# Patient Record
Sex: MALE | Race: BLACK OR AFRICAN AMERICAN | NOT HISPANIC OR LATINO | Employment: FULL TIME | URBAN - METROPOLITAN AREA
[De-identification: names, ages, dates, MRNs, and addresses within clinical notes are randomized per-mention and may not be internally consistent; named-entity substitution may affect disease eponyms.]

---

## 2018-03-07 PROBLEM — E11.621 TYPE 2 DIABETES MELLITUS WITH FOOT ULCER, WITH LONG-TERM CURRENT USE OF INSULIN: Status: ACTIVE | Noted: 2018-03-07

## 2018-03-07 PROBLEM — N18.30 CKD STAGE 3 SECONDARY TO DIABETES: Status: ACTIVE | Noted: 2018-03-07

## 2018-03-07 PROBLEM — Z79.4 TYPE 2 DIABETES MELLITUS WITH FOOT ULCER, WITH LONG-TERM CURRENT USE OF INSULIN: Status: ACTIVE | Noted: 2018-03-07

## 2018-03-07 PROBLEM — L97.509 TYPE 2 DIABETES MELLITUS WITH FOOT ULCER, WITH LONG-TERM CURRENT USE OF INSULIN: Status: ACTIVE | Noted: 2018-03-07

## 2018-03-07 PROBLEM — E11.22 CKD STAGE 3 SECONDARY TO DIABETES: Status: ACTIVE | Noted: 2018-03-07

## 2018-03-08 PROBLEM — L03.115 CELLULITIS OF RIGHT FOOT: Status: ACTIVE | Noted: 2018-03-08

## 2018-03-08 PROBLEM — Z78.9 KNOWLEDGE DEFICIT ABOUT THERAPEUTIC DIET: Chronic | Status: ACTIVE | Noted: 2018-03-08

## 2018-03-08 PROBLEM — E66.9 OBESITY, CLASS II, BMI 35-39.9: Chronic | Status: ACTIVE | Noted: 2018-03-08

## 2018-03-08 PROBLEM — L02.619 ABSCESS OF FOOT: Status: ACTIVE | Noted: 2018-03-08

## 2018-03-08 PROBLEM — E11.22 CKD STAGE 3 SECONDARY TO DIABETES: Status: RESOLVED | Noted: 2018-03-07 | Resolved: 2018-03-08

## 2018-03-08 PROBLEM — Z55.8 KNOWLEDGE DEFICIT ABOUT THERAPEUTIC DIET: Chronic | Status: ACTIVE | Noted: 2018-03-08

## 2018-03-08 PROBLEM — E11.621 DIABETIC ULCER OF RIGHT MIDFOOT ASSOCIATED WITH TYPE 2 DIABETES MELLITUS, WITH FAT LAYER EXPOSED: Status: ACTIVE | Noted: 2018-03-08

## 2018-03-08 PROBLEM — I10 BENIGN ESSENTIAL HTN: Chronic | Status: ACTIVE | Noted: 2018-03-08

## 2018-03-08 PROBLEM — L97.412 DIABETIC ULCER OF RIGHT MIDFOOT ASSOCIATED WITH TYPE 2 DIABETES MELLITUS, WITH FAT LAYER EXPOSED: Status: ACTIVE | Noted: 2018-03-08

## 2018-03-08 PROBLEM — M86.9 OSTEOMYELITIS OF RIGHT FOOT: Status: ACTIVE | Noted: 2018-03-08

## 2018-03-08 PROBLEM — N18.4 CKD (CHRONIC KIDNEY DISEASE), STAGE IV: Chronic | Status: ACTIVE | Noted: 2018-03-08

## 2018-03-08 PROBLEM — N18.30 CKD STAGE 3 SECONDARY TO DIABETES: Status: RESOLVED | Noted: 2018-03-07 | Resolved: 2018-03-08

## 2018-03-08 PROBLEM — E66.812 OBESITY, CLASS II, BMI 35-39.9: Chronic | Status: ACTIVE | Noted: 2018-03-08

## 2018-03-11 PROBLEM — A49.1 GBS (GROUP B STREPTOCOCCUS) INFECTION: Status: ACTIVE | Noted: 2018-03-11

## 2018-05-15 PROBLEM — N10 ACUTE PYELONEPHRITIS: Status: ACTIVE | Noted: 2018-05-15

## 2018-05-18 PROBLEM — D63.1 ANEMIA OF RENAL DISEASE: Chronic | Status: ACTIVE | Noted: 2018-05-18

## 2018-05-18 PROBLEM — N18.9 ANEMIA OF RENAL DISEASE: Chronic | Status: ACTIVE | Noted: 2018-05-18

## 2018-05-19 PROBLEM — L03.115 CELLULITIS OF RIGHT FOOT: Status: RESOLVED | Noted: 2018-03-08 | Resolved: 2018-05-19

## 2019-01-08 PROBLEM — D64.9 SYMPTOMATIC ANEMIA: Status: ACTIVE | Noted: 2019-01-08

## 2019-01-09 PROBLEM — E87.5 HYPERKALEMIA: Status: ACTIVE | Noted: 2019-01-09

## 2019-01-13 PROBLEM — E87.5 HYPERKALEMIA: Status: RESOLVED | Noted: 2019-01-09 | Resolved: 2019-01-13

## 2019-01-14 PROBLEM — N18.6 ESRD (END STAGE RENAL DISEASE): Status: ACTIVE | Noted: 2019-01-14

## 2019-01-14 PROBLEM — R50.9 FEVER: Status: ACTIVE | Noted: 2019-01-14

## 2019-01-14 PROBLEM — D64.9 SYMPTOMATIC ANEMIA: Status: RESOLVED | Noted: 2019-01-08 | Resolved: 2019-01-14

## 2019-01-17 PROBLEM — R50.9 FEVER: Status: RESOLVED | Noted: 2019-01-14 | Resolved: 2019-01-17

## 2019-01-17 PROBLEM — N18.4 CKD (CHRONIC KIDNEY DISEASE), STAGE IV: Chronic | Status: RESOLVED | Noted: 2018-03-08 | Resolved: 2019-01-17

## 2019-05-20 DIAGNOSIS — Z76.82 ORGAN TRANSPLANT CANDIDATE: Primary | ICD-10-CM

## 2019-05-21 ENCOUNTER — TELEPHONE (OUTPATIENT)
Dept: TRANSPLANT | Facility: CLINIC | Age: 34
End: 2019-05-21

## 2019-10-30 PROBLEM — I95.9 HYPOTENSION: Status: ACTIVE | Noted: 2019-10-30

## 2019-10-31 PROBLEM — E87.6 HYPOKALEMIA: Status: ACTIVE | Noted: 2019-10-31

## 2019-10-31 PROBLEM — T82.848A PAIN FROM A/V FISTULA: Status: ACTIVE | Noted: 2019-10-31

## 2019-11-02 PROBLEM — M00.9 SEPTIC ARTHRITIS OF LEFT FOOT: Status: ACTIVE | Noted: 2019-11-02

## 2019-11-02 PROBLEM — I95.9 HYPOTENSION: Status: RESOLVED | Noted: 2019-10-30 | Resolved: 2019-11-02

## 2019-11-03 PROBLEM — E87.6 HYPOKALEMIA: Status: RESOLVED | Noted: 2019-10-31 | Resolved: 2019-11-03

## 2019-11-05 PROBLEM — L08.9 WOUND INFECTION: Status: ACTIVE | Noted: 2019-11-05

## 2019-11-05 PROBLEM — T14.8XXA WOUND INFECTION: Status: ACTIVE | Noted: 2019-11-05

## 2019-11-05 PROBLEM — M00.9 SEPTIC ARTHRITIS OF LEFT FOOT: Status: RESOLVED | Noted: 2019-11-02 | Resolved: 2019-11-05

## 2020-01-22 PROBLEM — R50.9 FEVER: Status: ACTIVE | Noted: 2020-01-22

## 2020-01-23 PROBLEM — Z95.828 S/P ARTERIOVENOUS (AV) GRAFT PLACEMENT: Status: ACTIVE | Noted: 2020-01-23

## 2020-01-25 PROBLEM — I82.90 THROMBOSIS: Status: ACTIVE | Noted: 2020-01-25

## 2020-01-25 PROBLEM — A41.9 SEPSIS: Status: ACTIVE | Noted: 2020-01-25

## 2020-01-25 PROBLEM — R50.9 FEVER: Status: RESOLVED | Noted: 2020-01-22 | Resolved: 2020-01-25

## 2020-01-28 PROBLEM — R50.9 FEVER: Status: ACTIVE | Noted: 2020-01-28

## 2020-01-28 PROBLEM — R50.9 FEVER: Status: RESOLVED | Noted: 2020-01-28 | Resolved: 2020-01-28

## 2020-03-09 ENCOUNTER — HOSPITAL ENCOUNTER (OUTPATIENT)
Facility: HOSPITAL | Age: 35
Discharge: HOME | End: 2020-03-10
Attending: HOSPITALIST | Admitting: HOSPITALIST
Payer: COMMERCIAL

## 2020-03-09 DIAGNOSIS — R07.9 CHEST PAIN: ICD-10-CM

## 2020-03-09 DIAGNOSIS — Z99.2 ACUTE RENAL FAILURE SUPERIMPOSED ON CHRONIC KIDNEY DISEASE, ON CHRONIC DIALYSIS: ICD-10-CM

## 2020-03-09 DIAGNOSIS — D64.9 ANEMIA REQUIRING TRANSFUSIONS: Primary | Chronic | ICD-10-CM

## 2020-03-09 DIAGNOSIS — N18.6 ESRD (END STAGE RENAL DISEASE): ICD-10-CM

## 2020-03-09 DIAGNOSIS — N18.6 ACUTE RENAL FAILURE SUPERIMPOSED ON CHRONIC KIDNEY DISEASE, ON CHRONIC DIALYSIS: ICD-10-CM

## 2020-03-09 DIAGNOSIS — N17.9 ACUTE RENAL FAILURE SUPERIMPOSED ON CHRONIC KIDNEY DISEASE, ON CHRONIC DIALYSIS: ICD-10-CM

## 2020-03-09 PROBLEM — E66.9 OBESITY (BMI 30-39.9): Status: ACTIVE | Noted: 2020-03-09

## 2020-03-09 LAB — POCT GLUCOSE: 110 MG/DL (ref 70–110)

## 2020-03-09 PROCEDURE — A4216 STERILE WATER/SALINE, 10 ML: HCPCS | Performed by: HOSPITALIST

## 2020-03-09 PROCEDURE — 25000003 PHARM REV CODE 250: Performed by: HOSPITALIST

## 2020-03-09 PROCEDURE — G0379 DIRECT REFER HOSPITAL OBSERV: HCPCS

## 2020-03-09 PROCEDURE — G0378 HOSPITAL OBSERVATION PER HR: HCPCS

## 2020-03-09 PROCEDURE — 94761 N-INVAS EAR/PLS OXIMETRY MLT: CPT

## 2020-03-09 RX ORDER — IBUPROFEN 200 MG
16 TABLET ORAL
Status: DISCONTINUED | OUTPATIENT
Start: 2020-03-09 | End: 2020-03-10 | Stop reason: HOSPADM

## 2020-03-09 RX ORDER — AMOXICILLIN 250 MG
1 CAPSULE ORAL 2 TIMES DAILY
Status: DISCONTINUED | OUTPATIENT
Start: 2020-03-09 | End: 2020-03-10 | Stop reason: HOSPADM

## 2020-03-09 RX ORDER — INSULIN ASPART 100 [IU]/ML
2-10 INJECTION, SOLUTION INTRAVENOUS; SUBCUTANEOUS
Status: DISCONTINUED | OUTPATIENT
Start: 2020-03-09 | End: 2020-03-10 | Stop reason: HOSPADM

## 2020-03-09 RX ORDER — AMMONIUM LACTATE 12 G/100G
1 LOTION TOPICAL
Status: ON HOLD | COMMUNITY
Start: 2019-05-21 | End: 2021-03-12 | Stop reason: HOSPADM

## 2020-03-09 RX ORDER — SODIUM CHLORIDE 0.9 % (FLUSH) 0.9 %
10 SYRINGE (ML) INJECTION EVERY 8 HOURS
Status: DISCONTINUED | OUTPATIENT
Start: 2020-03-09 | End: 2020-03-10 | Stop reason: HOSPADM

## 2020-03-09 RX ORDER — IBUPROFEN 200 MG
24 TABLET ORAL
Status: DISCONTINUED | OUTPATIENT
Start: 2020-03-09 | End: 2020-03-10 | Stop reason: HOSPADM

## 2020-03-09 RX ORDER — ACETAMINOPHEN 325 MG/1
650 TABLET ORAL EVERY 8 HOURS PRN
Status: DISCONTINUED | OUTPATIENT
Start: 2020-03-09 | End: 2020-03-10

## 2020-03-09 RX ORDER — SEVELAMER CARBONATE 800 MG/1
1600 TABLET, FILM COATED ORAL
Status: DISCONTINUED | OUTPATIENT
Start: 2020-03-10 | End: 2020-03-10 | Stop reason: HOSPADM

## 2020-03-09 RX ORDER — GLUCAGON 1 MG
1 KIT INJECTION
Status: DISCONTINUED | OUTPATIENT
Start: 2020-03-09 | End: 2020-03-10 | Stop reason: HOSPADM

## 2020-03-09 RX ORDER — GABAPENTIN 100 MG/1
100 CAPSULE ORAL
Status: ON HOLD | COMMUNITY
Start: 2019-05-21 | End: 2021-03-12 | Stop reason: HOSPADM

## 2020-03-09 RX ORDER — CINACALCET 60 MG/1
TABLET, FILM COATED ORAL
Status: ON HOLD | COMMUNITY
Start: 2020-01-23 | End: 2020-06-11 | Stop reason: HOSPADM

## 2020-03-09 RX ORDER — ONDANSETRON 2 MG/ML
4 INJECTION INTRAMUSCULAR; INTRAVENOUS EVERY 8 HOURS PRN
Status: DISCONTINUED | OUTPATIENT
Start: 2020-03-09 | End: 2020-03-10

## 2020-03-09 RX ADMIN — Medication 10 ML: at 10:03

## 2020-03-09 RX ADMIN — SENNOSIDES AND DOCUSATE SODIUM 1 TABLET: 8.6; 5 TABLET ORAL at 10:03

## 2020-03-09 NOTE — PLAN OF CARE
Outside Transfer Acceptance Note        Patients name/MRN:     Brenda Huerta MRN: [id number]     Referring Physician or Mid-Level provider giving report:   Ethan Baxter MD     Referral Facility:    [facility]     Date/Time of Acceptance:    3/9/20 at 3:23pm     Accepting Physician for admission to hospital: Bhavik Almanza MD ()     Accepting facility:    [facility]     Consulting Physicians from [facility] involved in case:     Reason for transfer request:    Capacity issues    33yo man with ESRD on HD MWF who has missed 2 out of last 3 HD sessions presents with volume overload and fatigue. He had been found to have low Hb on 3/5 and was told by outpatient dialysis center to come in for blood at that time, but he did not and then missed his 3/6 dialysis session. He presented this am with fatigue and some mild dyspnea. Discussed with outpatient Nephrologist and he requires dialysis and blood today (Hb 7.0), but due to capacity issues at [facility], is not able to receive at their facility.    To Do List upon arrival:    1) Consult Nephrology for HD  2) Transfuse 1u PRBC    Vital signs:   BP (!) 149/89   Pulse 71   Temp 98.4 °F (36.9 °C) (Oral)   Resp 16   Ht 6' (1.829 m)   Wt 115.2 kg (253 lb 15.5 oz)   SpO2 99%   BMI 34.44 kg/m²     Past Medical History:   Diagnosis Date    Anemia     Asthma     Bacteremia 01/25/2020    Cellulitis of right foot     Cellulitis of right index finger     Chronic recurrent multifocal osteomyelitis of right foot     CKD (chronic kidney disease) stage 5, GFR less than 15 ml/min     Clotted renal dialysis AV graft     Diabetes mellitus     Diabetic foot ulcer     Dyspnea     Encounter for blood transfusion     ESRD (end stage renal disease) on dialysis     High cholesterol     History of group B Streptococcus (GBS) infection 03/07/2018    History of necrotising fasciitis     Hypertension     Hypokalemia     Osteomyelitis of  left foot     PAD (peripheral artery disease)     Pyelonephritis     S/P transmetatarsal amputation of foot, right     Secondary lymphedema     Sepsis due to other specified Staphylococcus 01/25/2020    staph lugdunensis    Transfusion reaction     fever/hives     Past Surgical History:   Procedure Laterality Date    FOOT AMPUTATION Right     Partial    INSERTION OF CATHETER FOR HEMODIALYSIS Right 1/10/2019    Procedure: INSERTION, CATHETER, HEMODIALYSIS;  Surgeon: Salas Ryder MD;  Location: [facility];  Service: General;  Laterality: Right;  RIGHT SUBCLAVIAN    INSERTION OF TUNNELED CENTRAL VENOUS HEMODIALYSIS CATHETER Left 01/28/2020    KNEE ARTHROSCOPY Right 2011    PERCUTANEOUS TRANSLUMINAL ANGIOPLASTY OF ARTERIOVENOUS FISTULA Right 10/22/2019    balloon angioplasty and stent    THIGH SURGERY Right     necrotising fasciitis    TOE AMPUTATION Left 03/2014    5th toe    TOTAL ANKLE ARTHROPLASTY Right 2010     Allergies:  Review of patient's allergies indicates:   Allergen Reactions    Amphotericin b Other (See Comments)    Bactrim [sulfamethoxazole-trimethoprim]     Lipitor [atorvastatin] Other (See Comments)     cough    Lisinopril Other (See Comments)     Cough      Percocet [oxycodone-acetaminophen] Itching    Codeine Rash       Current Facility-Administered Medications:   Current Facility-Administered Medications:     0.9%  NaCl infusion (for blood administration), , Intravenous, Q24H PRN, Ethan Baxter MD    Current Outpatient Medications:     sevelamer carbonate (RENVELA) 800 mg Tab, Take 2 tablets (1,600 mg total) by mouth 3 (three) times daily with meals., Disp: 180 tablet, Rfl: 11    LABS:  see Epic    Imaging:  see Epic      Bhavik Almanza MD  Department of Hospital Medicine  Patient Flow Center/   [phone]

## 2020-03-10 VITALS
HEIGHT: 72 IN | HEART RATE: 65 BPM | RESPIRATION RATE: 18 BRPM | DIASTOLIC BLOOD PRESSURE: 98 MMHG | BODY MASS INDEX: 34.27 KG/M2 | SYSTOLIC BLOOD PRESSURE: 167 MMHG | WEIGHT: 253 LBS | TEMPERATURE: 97 F | OXYGEN SATURATION: 99 %

## 2020-03-10 PROBLEM — D64.9 ANEMIA REQUIRING TRANSFUSIONS: Chronic | Status: ACTIVE | Noted: 2018-05-18

## 2020-03-10 PROBLEM — Z91.158 NONCOMPLIANCE WITH RENAL DIALYSIS: Status: ACTIVE | Noted: 2020-03-10

## 2020-03-10 LAB
ABO + RH BLD: NORMAL
ANION GAP SERPL CALC-SCNC: 16 MMOL/L (ref 8–16)
BASOPHILS # BLD AUTO: 0.08 K/UL (ref 0–0.2)
BASOPHILS NFR BLD: 1.6 % (ref 0–1.9)
BLD GP AB SCN CELLS X3 SERPL QL: NORMAL
BLD PROD TYP BPU: NORMAL
BLD PROD TYP BPU: NORMAL
BLOOD GROUP ANTIBODIES SERPL: NORMAL
BLOOD UNIT EXPIRATION DATE: NORMAL
BLOOD UNIT EXPIRATION DATE: NORMAL
BLOOD UNIT TYPE CODE: 6200
BLOOD UNIT TYPE CODE: 6200
BLOOD UNIT TYPE: NORMAL
BLOOD UNIT TYPE: NORMAL
BUN SERPL-MCNC: 75 MG/DL (ref 6–20)
CALCIUM SERPL-MCNC: 8.5 MG/DL (ref 8.7–10.5)
CHLORIDE SERPL-SCNC: 108 MMOL/L (ref 95–110)
CO2 SERPL-SCNC: 20 MMOL/L (ref 23–29)
CODING SYSTEM: NORMAL
CODING SYSTEM: NORMAL
CREAT SERPL-MCNC: 17.1 MG/DL (ref 0.5–1.4)
DIFFERENTIAL METHOD: ABNORMAL
DISPENSE STATUS: NORMAL
DISPENSE STATUS: NORMAL
EOSINOPHIL # BLD AUTO: 0.3 K/UL (ref 0–0.5)
EOSINOPHIL NFR BLD: 5.8 % (ref 0–8)
ERYTHROCYTE [DISTWIDTH] IN BLOOD BY AUTOMATED COUNT: 16.3 % (ref 11.5–14.5)
EST. GFR  (AFRICAN AMERICAN): 4 ML/MIN/1.73 M^2
EST. GFR  (NON AFRICAN AMERICAN): 3 ML/MIN/1.73 M^2
GLUCOSE SERPL-MCNC: 98 MG/DL (ref 70–110)
HCT VFR BLD AUTO: 24.1 % (ref 40–54)
HGB BLD-MCNC: 6.9 G/DL (ref 14–18)
IMM GRANULOCYTES # BLD AUTO: 0.01 K/UL (ref 0–0.04)
LYMPHOCYTES # BLD AUTO: 1.3 K/UL (ref 1–4.8)
LYMPHOCYTES NFR BLD: 26 % (ref 18–48)
MAGNESIUM SERPL-MCNC: 2.6 MG/DL (ref 1.6–2.6)
MCH RBC QN AUTO: 26.6 PG (ref 27–31)
MCHC RBC AUTO-ENTMCNC: 28.6 G/DL (ref 32–36)
MCV RBC AUTO: 93 FL (ref 82–98)
MONOCYTES # BLD AUTO: 0.5 K/UL (ref 0.3–1)
MONOCYTES NFR BLD: 9.3 % (ref 4–15)
NEUTROPHILS # BLD AUTO: 3 K/UL (ref 1.8–7.7)
NEUTROPHILS NFR BLD: 57.1 % (ref 38–73)
NRBC BLD-RTO: 0 /100 WBC
NUM UNITS TRANS PACKED RBC: NORMAL
NUM UNITS TRANS PACKED RBC: NORMAL
PHOSPHATE SERPL-MCNC: 8.7 MG/DL (ref 2.7–4.5)
PLATELET # BLD AUTO: 148 K/UL (ref 150–350)
PMV BLD AUTO: 10.2 FL (ref 9.2–12.9)
POCT GLUCOSE: 116 MG/DL (ref 70–110)
POCT GLUCOSE: 99 MG/DL (ref 70–110)
POTASSIUM SERPL-SCNC: 5 MMOL/L (ref 3.5–5.1)
RBC # BLD AUTO: 2.59 M/UL (ref 4.6–6.2)
SODIUM SERPL-SCNC: 144 MMOL/L (ref 136–145)
WBC # BLD AUTO: 5.16 K/UL (ref 3.9–12.7)

## 2020-03-10 PROCEDURE — 96374 THER/PROPH/DIAG INJ IV PUSH: CPT

## 2020-03-10 PROCEDURE — 25000003 PHARM REV CODE 250: Performed by: HOSPITALIST

## 2020-03-10 PROCEDURE — 94760 N-INVAS EAR/PLS OXIMETRY 1: CPT

## 2020-03-10 PROCEDURE — 86905 BLOOD TYPING RBC ANTIGENS: CPT

## 2020-03-10 PROCEDURE — 90935 HEMODIALYSIS ONE EVALUATION: CPT

## 2020-03-10 PROCEDURE — 86870 RBC ANTIBODY IDENTIFICATION: CPT

## 2020-03-10 PROCEDURE — G0378 HOSPITAL OBSERVATION PER HR: HCPCS

## 2020-03-10 PROCEDURE — 86850 RBC ANTIBODY SCREEN: CPT

## 2020-03-10 PROCEDURE — 80048 BASIC METABOLIC PNL TOTAL CA: CPT

## 2020-03-10 PROCEDURE — 83735 ASSAY OF MAGNESIUM: CPT

## 2020-03-10 PROCEDURE — 94761 N-INVAS EAR/PLS OXIMETRY MLT: CPT

## 2020-03-10 PROCEDURE — 86902 BLOOD TYPE ANTIGEN DONOR EA: CPT

## 2020-03-10 PROCEDURE — 86922 COMPATIBILITY TEST ANTIGLOB: CPT

## 2020-03-10 PROCEDURE — 85025 COMPLETE CBC W/AUTO DIFF WBC: CPT

## 2020-03-10 PROCEDURE — 36415 COLL VENOUS BLD VENIPUNCTURE: CPT

## 2020-03-10 PROCEDURE — 63600175 PHARM REV CODE 636 W HCPCS: Performed by: HOSPITALIST

## 2020-03-10 PROCEDURE — A4216 STERILE WATER/SALINE, 10 ML: HCPCS | Performed by: HOSPITALIST

## 2020-03-10 PROCEDURE — P9016 RBC LEUKOCYTES REDUCED: HCPCS

## 2020-03-10 PROCEDURE — 84100 ASSAY OF PHOSPHORUS: CPT

## 2020-03-10 RX ORDER — MUPIROCIN 20 MG/G
OINTMENT TOPICAL 2 TIMES DAILY
Status: DISCONTINUED | OUTPATIENT
Start: 2020-03-10 | End: 2020-03-10

## 2020-03-10 RX ORDER — SODIUM CHLORIDE 0.9 % (FLUSH) 0.9 %
10 SYRINGE (ML) INJECTION
Status: DISCONTINUED | OUTPATIENT
Start: 2020-03-10 | End: 2020-03-10 | Stop reason: HOSPADM

## 2020-03-10 RX ORDER — SODIUM CHLORIDE 9 MG/ML
INJECTION, SOLUTION INTRAVENOUS ONCE
Status: CANCELLED | OUTPATIENT
Start: 2020-03-10 | End: 2020-03-10

## 2020-03-10 RX ORDER — HYDROCODONE BITARTRATE AND ACETAMINOPHEN 500; 5 MG/1; MG/1
TABLET ORAL
Status: DISCONTINUED | OUTPATIENT
Start: 2020-03-10 | End: 2020-03-10

## 2020-03-10 RX ORDER — SODIUM CHLORIDE 9 MG/ML
INJECTION, SOLUTION INTRAVENOUS
Status: CANCELLED | OUTPATIENT
Start: 2020-03-10

## 2020-03-10 RX ORDER — DIPHENHYDRAMINE HYDROCHLORIDE 50 MG/ML
25 INJECTION INTRAMUSCULAR; INTRAVENOUS ONCE
Status: COMPLETED | OUTPATIENT
Start: 2020-03-10 | End: 2020-03-10

## 2020-03-10 RX ORDER — ACETAMINOPHEN 325 MG/1
650 TABLET ORAL EVERY 6 HOURS PRN
Status: DISCONTINUED | OUTPATIENT
Start: 2020-03-10 | End: 2020-03-10 | Stop reason: HOSPADM

## 2020-03-10 RX ADMIN — Medication 10 ML: at 06:03

## 2020-03-10 RX ADMIN — SEVELAMER CARBONATE 1600 MG: 800 TABLET, FILM COATED ORAL at 08:03

## 2020-03-10 RX ADMIN — DIPHENHYDRAMINE HYDROCHLORIDE 25 MG: 50 INJECTION INTRAMUSCULAR; INTRAVENOUS at 02:03

## 2020-03-10 RX ADMIN — ACETAMINOPHEN 650 MG: 325 TABLET ORAL at 02:03

## 2020-03-10 RX ADMIN — SENNOSIDES AND DOCUSATE SODIUM 1 TABLET: 8.6; 5 TABLET ORAL at 08:03

## 2020-03-10 NOTE — PROGRESS NOTES
HD:  3 hour tx complete, lines reinfused, catheter capped and clamped, dressing changed.  Pt tolerated tx well.  2 units PRBCs transfused during HD.    NET UF:  2000 mL

## 2020-03-10 NOTE — CONSULTS
Nephrology Consult Note        Patient Name: Brenda Huerta  MRN: [id number]    Patient Class: OP- Observation   Admission Date: 3/9/2020  Length of Stay: 0 days  Date of Service: 3/10/2020    Attending Physician: Rosemary Mccollum MD  Primary Care Provider: Davy Martinez MD    Reason for Consult: esrd/anemia/htn/shpt    SUBJECTIVE:     HPI: 34[facility]  for Nephrology evaluation and dialysis. Has ESRD on HD MWF, hypertension, PAD, diet-controlled DM2, right foot partial amputation. He missed dialysis on Friday as he was called by the dialysis center and told he needs to have a transfusion which he did not follow up. He then missed his Monday dialysis for some reason. Complains of mild fatigue and dyspnea on exertion.    Past Medical History:   Diagnosis Date    Anemia     Asthma     Bacteremia 01/25/2020    Cellulitis of right foot     Cellulitis of right index finger     Chronic recurrent multifocal osteomyelitis of right foot     CKD (chronic kidney disease) stage 5, GFR less than 15 ml/min     Clotted renal dialysis AV graft     Diabetes mellitus     Diabetic foot ulcer     Dyspnea     Encounter for blood transfusion     ESRD (end stage renal disease) on dialysis     High cholesterol     History of group B Streptococcus (GBS) infection 03/07/2018    History of necrotising fasciitis     Hypertension     Hypokalemia     Osteomyelitis of left foot     PAD (peripheral artery disease)     Pyelonephritis     S/P transmetatarsal amputation of foot, right     Secondary lymphedema     Sepsis due to other specified Staphylococcus 01/25/2020    staph lugdunensis    Transfusion reaction     fever/hives     Past Surgical History:   Procedure Laterality Date    FOOT AMPUTATION Right     Partial    INSERTION OF CATHETER FOR HEMODIALYSIS Right 1/10/2019    Procedure: INSERTION, CATHETER, HEMODIALYSIS;  Surgeon: Salas Ryder MD;  Location:  [facility];  Service: General;  Laterality: Right;  RIGHT SUBCLAVIAN    INSERTION OF TUNNELED CENTRAL VENOUS HEMODIALYSIS CATHETER Left 01/28/2020    KNEE ARTHROSCOPY Right 2011    PERCUTANEOUS TRANSLUMINAL ANGIOPLASTY OF ARTERIOVENOUS FISTULA Right 10/22/2019    balloon angioplasty and stent    THIGH SURGERY Right     necrotising fasciitis    TOE AMPUTATION Left 03/2014    5th toe    TOTAL ANKLE ARTHROPLASTY Right 2010     Family History   Problem Relation Age of Onset    Diabetes Mother     Hypertension Mother     Diabetes Father     Hypertension Father      Social History     Tobacco Use    Smoking status: Never Smoker    Smokeless tobacco: Never Used   Substance Use Topics    Alcohol use: No    Drug use: No       Review of patient's allergies indicates:   Allergen Reactions    Amphotericin b Other (See Comments)    Bactrim [sulfamethoxazole-trimethoprim]     Lipitor [atorvastatin] Other (See Comments)     cough    Lisinopril Other (See Comments)     Cough      Percocet [oxycodone-acetaminophen] Itching    Codeine Rash       Outpatient meds:  Current Facility-Administered Medications on File Prior to Encounter   Medication Dose Route Frequency Provider Last Rate Last Dose    [DISCONTINUED] 0.9%  NaCl infusion (for blood administration)   Intravenous Q24H PRN Ethan Baxter MD         Current Outpatient Medications on File Prior to Encounter   Medication Sig Dispense Refill    ammonium lactate (LAC-HYDRIN) 12 % lotion Apply 1 application topically.      cinacalcet (SENSIPAR) 60 MG Tab       gabapentin (NEURONTIN) 100 MG capsule Take 100 mg by mouth.      sevelamer carbonate (RENVELA) 800 mg Tab Take 2 tablets (1,600 mg total) by mouth 3 (three) times daily with meals. 180 tablet 11       Scheduled meds:   diphenhydrAMINE  25 mg Intravenous Once    insulin aspart U-100  2-10 Units Subcutaneous QID (AC & HS)    mupirocin   Nasal BID    senna-docusate 8.6-50 mg  1 tablet Oral BID     sevelamer carbonate  1,600 mg Oral TID WM    sodium chloride 0.9%  10 mL Intravenous Q8H       Infusions:      PRN meds:  acetaminophen, dextrose 50%, dextrose 50%, glucagon (human recombinant), glucose, glucose, ondansetron, sodium chloride 0.9%, trazodone    Review of Systems:  Review of Systems   Constitutional: Negative for chills, fever, malaise/fatigue and weight loss.   HENT: Negative for hearing loss and nosebleeds.    Eyes: Negative for blurred vision, double vision and photophobia.   Respiratory: Negative for cough, shortness of breath and wheezing.    Cardiovascular: Negative for chest pain, palpitations and leg swelling.   Gastrointestinal: Negative for abdominal pain, constipation, diarrhea, heartburn, nausea and vomiting.   Genitourinary: Negative for dysuria, frequency and urgency.   Musculoskeletal: Negative for falls, joint pain and myalgias.   Skin: Negative for itching and rash.   Neurological: Negative for dizziness, speech change, focal weakness, loss of consciousness and headaches.   Endo/Heme/Allergies: Does not bruise/bleed easily.   Psychiatric/Behavioral: Negative for depression and substance abuse. The patient is not nervous/anxious.        OBJECTIVE:     Vital Signs and IO (Last 24H):  Temp:  [96.8 °F (36 °C)-98.4 °F (36.9 °C)]   Pulse:  [71-99]   Resp:  [16-18]   BP: (120-162)/(69-98)   SpO2:  [97 %-100 %]   I/O last 3 completed shifts:  In: 300 [P.O.:300]  Out: -     Wt Readings from Last 5 Encounters:   03/09/20 114.8 kg (253 lb)   03/09/20 115.2 kg (253 lb 15.5 oz)   03/09/20 114.8 kg (253 lb)   01/29/20 110.3 kg (243 lb 2.7 oz)   01/17/20 109.5 kg (241 lb 6.5 oz)         Physical Exam:  Physical Exam   Constitutional: He is oriented to person, place, and time. He appears well-developed and well-nourished.   HENT:   Head: Normocephalic and atraumatic.   Mouth/Throat: Oropharynx is clear and moist.   Eyes: Pupils are equal, round, and reactive to light. EOM are normal. No scleral  icterus.   Neck: Neck supple.   Cardiovascular: Normal rate and regular rhythm.   Pulmonary/Chest: Effort normal. No stridor. No respiratory distress.   Abdominal: Soft. He exhibits no distension.   Musculoskeletal: Normal range of motion. He exhibits no edema or deformity.   Neurological: He is alert and oriented to person, place, and time. No cranial nerve deficit.   Skin: Skin is warm and dry. No rash noted. He is not diaphoretic. No erythema.   Psychiatric: He has a normal mood and affect. His behavior is normal.       Body mass index is 34.31 kg/m².    Laboratory:  Recent Labs   Lab 03/09/20  1424 03/10/20  0555    144   K 4.4 5.0    108   CO2 23 20*   BUN 73* 75*   CREATININE 15.2* 17.1*   ESTGFRAFRICA 4.2* 4*   EGFRNONAA 3.6* 3*    98       Recent Labs   Lab 03/09/20  1424 03/10/20  0555   CALCIUM 8.9 8.5*   ALBUMIN 3.7  --    PHOS 8.9* 8.7*   MG 2.4 2.6       Recent Labs   Lab 01/10/19  0745   PTH, Intact 930.0 H       Recent Labs   Lab 03/09/20  2219 03/10/20  0604   POCTGLUCOSE 110 99       Recent Labs   Lab 05/16/18  0012 11/01/19  0437 01/24/20  0420   Hemoglobin A1C 5.5 4.9 5.3       Recent Labs   Lab 03/09/20  0912 03/10/20  0555   WBC 5.70 5.16   HGB 7.0* 6.9*   HCT 22.2* 24.1*    148*   MCV 87 93   MCHC 31.6* 28.6*   MONO 10.5  0.6 9.3  0.5       Recent Labs   Lab 03/09/20  1424   BILITOT 0.9   PROT 8.1   ALBUMIN 3.7   ALKPHOS 66   ALT 13*   AST 17       Recent Labs   Lab 05/15/18  2003 01/08/19  2248 01/23/20  0959   Color, UA Yellow Yellow Yellow   Appearance, UA Clear Clear Clear   pH, UA 7.0 6.0 8.0   Specific [place], UA 1.020 1.025 1.020   Protein, UA 3+ A 2+ A 3+ A   Glucose, UA Negative Negative Negative   Ketones, UA Negative Negative Negative   Urobilinogen, UA Negative Negative Negative   Bilirubin (UA) Negative Negative Negative   Occult Blood UA 3+ A 1+ A 1+ A   Nitrite, UA Negative Negative Negative   RBC, UA 30 H 3 0   WBC, UA 5 5 20 H   Bacteria Few A Rare  Occasional   Hyaline Casts, UA None Seen A 0 0             Microbiology Results (last 7 days)     ** No results found for the last 168 hours. **          ASSESSMENT/PLAN:     Active Hospital Problems    Diagnosis  POA    *Acute renal failure superimposed on chronic kidney disease, on chronic dialysis [N17.9, N18.9, Z99.2]  Not Applicable    Obesity (BMI 30-39.9) [E66.9]  Yes    Anemia of renal disease [N18.9, D63.1]  Yes     Chronic    Limited Adherence to Nutrition-Related Recommendations [Z78.9]  Yes     Chronic    Benign essential HTN [I10]  Yes     Chronic    Obesity, Class II, BMI 35-39.9 [E66.9]  Yes     Chronic      Resolved Hospital Problems   No resolved problems to display.     ESRD on HD MWF via [facility]  Continue current dialysis prescription.  Next HD on Wed, can be d/c after HD today if stable - can have outpatient HD on Wed.  Renal diet - low K, low phos.  No IVs or BP checks on access and/or non-dominant arm.    Anemia of CKD  Hgb and HCT are low.  Will provide ROSA and/or IV iron PRN.  Will transfuse 2 PRBC with HD.    MBD / Secondary HPT  Ca, phos, PTH and vitamin D levels are acceptable.   Phos binders, vitamin D analogues and calcimimetics as needed.    HTN  BP seem controlled.   Tolerate asymptomatic HTN up to -160.  Continue home meds.  Low sodium diet.    Thank you for allowing us to participate in the care of your patient!   We will follow the patient and provide recommendations as needed.    Gilbert oRss MD    [facility]  [address] [place] [id number]    [phone] - tel  [phone] - fax    3/10/2020 8:48 AM

## 2020-03-10 NOTE — NURSING
Patient transported to dialysis via wheelchair. 2 units prbcs obtained from lab and taken to dialysis

## 2020-03-10 NOTE — NURSING
Discharge instructions given to pt. Instructed on medicine regimen and f/u appts. Pt verbalizes understanding. IV  removed. Awaiting transport. Jennifer CARDOZA updated.

## 2020-03-10 NOTE — ASSESSMENT & PLAN NOTE
Acute on chronic problem  Patient missed dialysis on Friday and today  No signs or symptoms of acute fluid volume overload   Potassium within normal limits  Stable appearance  Consult Nephrology for dialysis in a.m.

## 2020-03-10 NOTE — PROGRESS NOTES
03/10/20 1016        Wound 01/23/20 2100 Diabetic Ulcer Foot   Date First Assessed/Time First Assessed: 01/23/20 2100   Pre-existing: Yes  Primary Wound Type: Diabetic Ulcer  Side: Right  Location: Foot   Dressing Appearance Open to air   Drainage Amount Scant   Drainage Characteristics/Odor Brown   Appearance Red   Wound Length (cm) 2 cm   Wound Width (cm) 2 cm   Wound Depth (cm) 0.1 cm   Wound Volume (cm^3) 0.4 cm^3   Wound Surface Area (cm^2) 4 cm^2     S/p amputation of forefoot. Incision is well healed.  Plantar DFU first metatarsal.  Recommend wash daily, paint with betadine, and cover with bandaid.

## 2020-03-10 NOTE — NURSING
Notified dr costello of low h/h,plt,and other labs. Also of patient need for receiving benadry and tylenol prior to transfusion due to hx reaction to blood

## 2020-03-10 NOTE — H&P
[facility] Hospital Medicine  History & Physical    Patient Name: Brenda Huerta  MRN: [id number]  Admission Date: 3/9/2020  Attending Physician: Rosemary Mccollum MD   Primary Care Provider: Davy Martinez MD         Patient information was obtained from patient, spouse/SO, past medical records and ER records.     Subjective:     Principal Problem:Acute renal failure superimposed on chronic kidney disease, on chronic dialysis    Chief Complaint: No chief complaint on file.       HPI: Brenda Huerta is a 34-year-old  male who was transferred from [facility] to [facility]  for nephrology evaluation and dialysis.  He has a previous medical history including end-stage renal disease with hemodialysis, hypertension, peripheral arterial disease, diet-controlled diabetes, right foot partial amputation.  He reports that he missed his dialysis on Friday as he was called by the dialysis center and told he needs to have a transfusion which she did not follow up.  He then missed his dialysis today.  He complains of mild fatigue and dyspnea on exertion.  He denies any nausea, vomiting, diarrhea, chest pain, shortness of breath. No recent sick contacts or travel.  No aggravating or alleviating factors.  Hospital Medicine was consulted for admission and management.  This time exam he was sitting upright on the side of the bed eating dinner in no obvious distress.   03/09/2020 20:08  Patient placed in observation.    Past Medical History:   Diagnosis Date    Anemia     Asthma     Bacteremia 01/25/2020    Cellulitis of right foot     Cellulitis of right index finger     Chronic recurrent multifocal osteomyelitis of right foot     CKD (chronic kidney disease) stage 5, GFR less than 15 ml/min     Clotted renal dialysis AV graft     Diabetes mellitus     Diabetic foot ulcer     Dyspnea     Encounter for blood transfusion     ESRD (end stage renal disease) on dialysis     High  cholesterol     History of group B Streptococcus (GBS) infection 03/07/2018    History of necrotising fasciitis     Hypertension     Hypokalemia     Osteomyelitis of left foot     PAD (peripheral artery disease)     Pyelonephritis     S/P transmetatarsal amputation of foot, right     Secondary lymphedema     Sepsis due to other specified Staphylococcus 01/25/2020    staph lugdunensis    Transfusion reaction     fever/hives       Past Surgical History:   Procedure Laterality Date    FOOT AMPUTATION Right     Partial    INSERTION OF CATHETER FOR HEMODIALYSIS Right 1/10/2019    Procedure: INSERTION, CATHETER, HEMODIALYSIS;  Surgeon: Salas Ryder MD;  Location: [facility];  Service: General;  Laterality: Right;  RIGHT SUBCLAVIAN    INSERTION OF TUNNELED CENTRAL VENOUS HEMODIALYSIS CATHETER Left 01/28/2020    KNEE ARTHROSCOPY Right 2011    PERCUTANEOUS TRANSLUMINAL ANGIOPLASTY OF ARTERIOVENOUS FISTULA Right 10/22/2019    balloon angioplasty and stent    THIGH SURGERY Right     necrotising fasciitis    TOE AMPUTATION Left 03/2014    5th toe    TOTAL ANKLE ARTHROPLASTY Right 2010       Review of patient's allergies indicates:   Allergen Reactions    Amphotericin b Other (See Comments)    Bactrim [sulfamethoxazole-trimethoprim]     Lipitor [atorvastatin] Other (See Comments)     cough    Lisinopril Other (See Comments)     Cough      Percocet [oxycodone-acetaminophen] Itching    Codeine Rash       Current Facility-Administered Medications on File Prior to Encounter   Medication    [DISCONTINUED] 0.9%  NaCl infusion (for blood administration)     Current Outpatient Medications on File Prior to Encounter   Medication Sig    ammonium lactate (LAC-HYDRIN) 12 % lotion Apply 1 application topically.    cinacalcet (SENSIPAR) 60 MG Tab     gabapentin (NEURONTIN) 100 MG capsule Take 100 mg by mouth.    sevelamer carbonate (RENVELA) 800 mg Tab Take 2 tablets (1,600 mg total) by mouth 3 (three)  times daily with meals.     Family History     Problem Relation (Age of Onset)    Diabetes Mother, Father    Hypertension Mother, Father        Tobacco Use    Smoking status: Never Smoker    Smokeless tobacco: Never Used   Substance and Sexual Activity    Alcohol use: No    Drug use: No    Sexual activity: Yes     Partners: Female     Review of Systems   Constitutional: Positive for activity change and fatigue. Negative for chills, diaphoresis and fever.   HENT: Negative for congestion, nosebleeds and tinnitus.    Eyes: Negative for photophobia and visual disturbance.   Respiratory: Positive for shortness of breath. Negative for cough, chest tightness and wheezing.    Cardiovascular: Negative for chest pain, palpitations and leg swelling.   Gastrointestinal: Negative for abdominal distention, abdominal pain, constipation, diarrhea, nausea and vomiting.   Endocrine: Negative for cold intolerance and heat intolerance.   Genitourinary: Negative for difficulty urinating, dysuria, frequency, hematuria and urgency.   Musculoskeletal: Negative for arthralgias, back pain and myalgias.   Skin: Negative for pallor, rash and wound.   Allergic/Immunologic: Negative for immunocompromised state.   Neurological: Positive for weakness. Negative for dizziness, tremors, facial asymmetry and speech difficulty.   Hematological: Negative for adenopathy. Does not bruise/bleed easily.   Psychiatric/Behavioral: Negative for confusion and sleep disturbance. The patient is not nervous/anxious.      Objective:     Vital Signs (Most Recent):    Vital Signs (24h Range):  Temp:  [98.4 °F (36.9 °C)] 98.4 °F (36.9 °C)  Pulse:  [71-82] 80  Resp:  [16] 16  SpO2:  [98 %-99 %] 99 %  BP: (149-160)/(78-98) 149/78        There is no height or weight on file to calculate BMI.    Physical Exam   Constitutional: He is oriented to person, place, and time. He appears well-developed and well-nourished. No distress.   HENT:   Head: Normocephalic.    Mouth/Throat: Oropharynx is clear and moist.   Eyes: Pupils are equal, round, and reactive to light. Conjunctivae are normal. No scleral icterus.   Neck: Normal range of motion. Neck supple. No JVD present.   Cardiovascular: Normal rate, regular rhythm, normal heart sounds and intact distal pulses. Exam reveals no gallop and no friction rub.   No murmur heard.  Pulmonary/Chest: Effort normal and breath sounds normal. No respiratory distress. He has no wheezes. He has no rales.   Abdominal: Soft. Bowel sounds are normal. He exhibits no distension. There is no tenderness. There is no rebound and no guarding.   Musculoskeletal: Normal range of motion. He exhibits no edema or tenderness.   Lymphadenopathy:     He has no cervical adenopathy.   Neurological: He is alert and oriented to person, place, and time. He displays normal reflexes. No cranial nerve deficit or sensory deficit. Coordination normal.   Skin: Skin is warm and dry. Capillary refill takes less than 2 seconds. No rash noted. He is not diaphoretic. No erythema. No pallor.   Psychiatric: He has a normal mood and affect. His behavior is normal. Judgment and thought content normal.   Nursing note and vitals reviewed.        CRANIAL NERVES     CN III, IV, VI   Pupils are equal, round, and reactive to light.       Significant Labs:   CBC:   Recent Labs   Lab 03/09/20  0912   WBC 5.70   HGB 7.0*   HCT 22.2*        CMP:   Recent Labs   Lab 03/09/20  1424      K 4.4      CO2 23      BUN 73*   CREATININE 15.2*   CALCIUM 8.9   PROT 8.1   ALBUMIN 3.7   BILITOT 0.9   ALKPHOS 66   AST 17   ALT 13*   ANIONGAP 17*   EGFRNONAA 3.6*       Significant Imaging: I have reviewed all pertinent imaging results/findings within the past 24 hours.    Assessment/Plan:     * Acute renal failure superimposed on chronic kidney disease, on chronic dialysis  Acute on chronic problem  Patient missed dialysis on Friday and today  No signs or symptoms of acute  fluid volume overload   Potassium within normal limits  Stable appearance  Consult Nephrology for dialysis in a.m.        Anemia of renal disease  Acute on Chronic Problem  Patient's anemia is currently controlled.  Etiology likely d/t chronic renal disease  Current CBC reviewed-   Lab Results   Component Value Date    HGB 7.0 (L) 03/09/2020    HCT 22.2 (L) 03/09/2020     Monitor serial CBC and transfuse if patient becomes hemodynamically unstable, symptomatic or H/H drops below 7/21.   The consult Nephrology as patient may require packed red blood cell transfusion during dialysis tomorrow  Hemodynamically stable at this time.  Will not transfuse tonight was patient becomes symptomatic    Obesity, Class II, BMI 35-39.9  Chronic Problem  There is no height or weight on file to calculate BMI. Morbid obesity complicates all aspects of disease management from diagnostic modalities to treatment. Weight loss encouraged and health benefits explained to patient.      Benign essential HTN  Chronic problem  Chronic, controlled.  Latest blood pressure and vitals reviewed-   Temp:  [98.4 °F (36.9 °C)]   Pulse:  [71-82]   Resp:  [16]   BP: (149-160)/(78-98)   SpO2:  [98 %-99 %] .   Home meds for hypertension were reviewed and noted below. Hospital anti-hypertensive changes were made as shown below.    Will utilize p.r.n. blood pressure medication only if patient's blood pressure greater than  180/110 and he develops symptoms such as worsening chest pain or shortness of breath.      Limited Adherence to Nutrition-Related Recommendations  Chronic problem  Stressed importance of following up with renal diet recommendations         VTE Risk Mitigation (From admission, onward)         Ordered     Place sequential compression device  Until discontinued      03/09/20 1909     Place ALESIA hose  Until discontinued      03/09/20 1909     IP VTE HIGH RISK PATIENT  Once      03/09/20 1909                   Shyam Fernandez NP  Department of  Hospital Medicine [facility]

## 2020-03-10 NOTE — HOSPITAL COURSE
Brenda Huerta is a 34-year-old  male admitted for HD and transfusion.  He had symptomatic anemia.  He had missed hemodialysis treatment sessions a week prior.  He was hypertensive on admission but not hypoxic.  Nephrology is consulted the Pt received 2 units of packed red blood cells on hemodialysis then D/C.  Alert no distress in lungs are clear.  Heart has Reg without murmur abdomen soft.  Extremities no edema.

## 2020-03-10 NOTE — ASSESSMENT & PLAN NOTE
Acute on Chronic Problem  Patient's anemia is currently controlled.  Etiology likely d/t chronic renal disease  Current CBC reviewed-   Lab Results   Component Value Date    HGB 7.0 (L) 03/09/2020    HCT 22.2 (L) 03/09/2020     Monitor serial CBC and transfuse if patient becomes hemodynamically unstable, symptomatic or H/H drops below 7/21.   The consult Nephrology as patient may require packed red blood cell transfusion during dialysis tomorrow  Hemodynamically stable at this time.  Will not transfuse tonight was patient becomes symptomatic

## 2020-03-10 NOTE — HPI
Brenda Huerta is a 34-year-old  male who was transferred from [facility] to [facility]  for nephrology evaluation and dialysis.  He has a previous medical history including end-stage renal disease with hemodialysis, hypertension, peripheral arterial disease, diet-controlled diabetes, right foot partial amputation.  He reports that he missed his dialysis on Friday as he was called by the dialysis center and told he needs to have a transfusion which she did not follow up.  He then missed his dialysis today.  He complains of mild fatigue and dyspnea on exertion.  He denies any nausea, vomiting, diarrhea, chest pain, shortness of breath. No recent sick contacts or travel.  No aggravating or alleviating factors.  Hospital Medicine was consulted for admission and management.  This time exam he was sitting upright on the side of the bed eating dinner in no obvious distress.

## 2020-03-10 NOTE — DISCHARGE SUMMARY
[facility] Hospital Medicine  Discharge Summary      Patient Name: Brenda Huerta  MRN: [id number]  Admission Date: 3/9/2020  Hospital Length of Stay: 0 days  Discharge Date and Time: 3/10/2020  7:58 PM  Attending Physician: Rosemary Mccollum MD   Discharging Provider: Rosemary Mccollum MD  Primary Care Provider: Davy Martinez MD      HPI:   Brenda Huerta is a 34-year-old  male who was transferred from [facility] to [facility]  for nephrology evaluation and dialysis.  He has a previous medical history including end-stage renal disease with hemodialysis, hypertension, peripheral arterial disease, diet-controlled diabetes, right foot partial amputation.  He reports that he missed his dialysis on Friday as he was called by the dialysis center and told he needs to have a transfusion which she did not follow up.  He then missed his dialysis today.  He complains of mild fatigue and dyspnea on exertion.  He denies any nausea, vomiting, diarrhea, chest pain, shortness of breath. No recent sick contacts or travel.  No aggravating or alleviating factors.  Hospital Medicine was consulted for admission and management.  This time exam he was sitting upright on the side of the bed eating dinner in no obvious distress.    * No surgery found *      Hospital Course:   Brenda Huerta is a 34-year-old  male admitted for HD and transfusion.  He had symptomatic anemia.  He had missed hemodialysis treatment sessions a week prior.  He was hypertensive on admission but not hypoxic.  Nephrology is consulted the Pt received 2 units of packed red blood cells on hemodialysis then D/C.  Alert no distress in lungs are clear.  Heart has Reg without murmur abdomen soft.  Extremities no edema.     Consults:   Consults (From admission, onward)        Status Ordering Provider     Inpatient consult to Nephrology  Once     Provider:  Gilbert Ross MD    Completed SOCORRO BHAKTA      Inpatient consult to Social Work/Case Management  Once     Provider:  (Not yet assigned)    SUZY Rabago          No new Assessment & Plan notes have been filed under this hospital service since the last note was generated.  Service: Hospital Medicine    Final Active Diagnoses:    Diagnosis Date Noted POA    PRINCIPAL PROBLEM:  Acute renal failure superimposed on chronic kidney disease, on chronic dialysis [N17.9, N18.9, Z99.2] 03/09/2020 Not Applicable    Noncompliance with renal dialysis [Z91.15] 03/10/2020 Not Applicable    Obesity (BMI 30-39.9) [E66.9] 03/09/2020 Yes    Anemia requiring transfusions [D64.9] 05/18/2018 Yes     Chronic    Limited Adherence to Nutrition-Related Recommendations [Z78.9] 03/08/2018 Yes     Chronic    Benign essential HTN [I10] 03/08/2018 Yes     Chronic    Obesity, Class II, BMI 35-39.9 [E66.9] 03/08/2018 Yes     Chronic      Problems Resolved During this Admission:       Discharged Condition: good    Disposition: Home or Self Care    Follow Up:  Follow-up Information     Davy Martinez MD In 1 week.    Specialty:  Internal Medicine  Contact information:  [address]  [address] [address] [phone]                 Patient Instructions:      Diet renal     Activity as tolerated       Significant Diagnostic Studies:   Results for SHIVASMARI (MRN [id number]) as of 3/10/2020 17:15   Ref. Range 3/9/2020 09:12 3/10/2020 05:55   WBC Latest Ref Range: 3.90 - 12.70 K/uL 5.70 5.16   RBC Latest Ref Range: 4.60 - 6.20 M/uL 2.55 (L) 2.59 (L)   Hemoglobin Latest Ref Range: 14.0 - 18.0 g/dL 7.0 (L) 6.9 (L)   Hematocrit Latest Ref Range: 40.0 - 54.0 % 22.2 (L) 24.1 (L)   MCV Latest Ref Range: 82 - 98 fL 87 93   MCH Latest Ref Range: 27.0 - 31.0 pg 27.4 26.6 (L)   MCHC Latest Ref Range: 32.0 - 36.0 g/dL 31.6 (L) 28.6 (L)   RDW Latest Ref Range: 11.5 - 14.5 % 18.3 (H) 16.3 (H)   Platelets Latest Ref Range: 150 - 350 K/uL 152 148 (L)   Results for MARI CALIX J  (MRN [id number]) as of 3/10/2020 17:15   Ref. Range 3/9/2020 14:24 3/10/2020 05:55   Sodium Latest Ref Range: 136 - 145 mmol/L 143 144   Potassium Latest Ref Range: 3.5 - 5.1 mmol/L 4.4 5.0   Chloride Latest Ref Range: 95 - 110 mmol/L 103 108   CO2 Latest Ref Range: 23 - 29 mmol/L 23 20 (L)   Anion Gap Latest Ref Range: 8 - 16 mmol/L 17 (H) 16   BUN, Bld Latest Ref Range: 6 - 20 mg/dL 73 (H) 75 (H)   Creatinine Latest Ref Range: 0.5 - 1.4 mg/dL 15.2 (H) 17.1 (H)   eGFR if non African American Latest Ref Range: >60 mL/min/1.73 m^2 3.6 (A) 3 (A)   eGFR if African American Latest Ref Range: >60 mL/min/1.73 m^2 4.2 (A) 4 (A)   Glucose Latest Ref Range: 70 - 110 mg/dL 104 98   Calcium Latest Ref Range: 8.7 - 10.5 mg/dL 8.9 8.5 (L)   Phosphorus Latest Ref Range: 2.7 - 4.5 mg/dL 8.9 (H) 8.7 (H)   Magnesium Latest Ref Range: 1.6 - 2.6 mg/dL 2.4 2.6   Alkaline Phosphatase Latest Ref Range: 38 - 126 U/L 66    PROTEIN TOTAL Latest Ref Range: 6.0 - 8.4 g/dL 8.1    Albumin Latest Ref Range: 3.5 - 5.2 g/dL 3.7    BILIRUBIN TOTAL Latest Ref Range: 0.3 - 1.2 mg/dL 0.9    AST Latest Ref Range: 15 - 41 U/L 17    ALT Latest Ref Range: 17 - 63 U/L 13 (L)        Pending Diagnostic Studies:     None         Medications:  Reconciled Home Medications:      Medication List      CONTINUE taking these medications    ammonium lactate 12 % lotion  Commonly known as:  LAC-HYDRIN  Apply 1 application topically.     cinacalcet 60 MG Tab  Commonly known as:  SENSIPAR     gabapentin 100 MG capsule  Commonly known as:  NEURONTIN  Take 100 mg by mouth.     sevelamer carbonate 800 mg Tab  Commonly known as:  RENVELA  Take 2 tablets (1,600 mg total) by mouth 3 (three) times daily with meals.            Indwelling Lines/Drains at time of discharge:   Lines/Drains/Airways     Central Venous Catheter Line                 Hemodialysis Catheter 01/28/20 0815 left subclavian 42 days          Drain                 Hemodialysis AV Fistula 03/09/20 0923 Right upper  arm 1 day                Time spent on the discharge of patient: 33 minutes  Patient was seen and examined on the date of discharge and determined to be suitable for discharge.         Rosemary Mccollum MD  Department of Hospital Medicine  [facility]

## 2020-03-10 NOTE — PLAN OF CARE
CM met with pt bedside to complete discharge assessment. Pt verified information on facesheet as correct. Pt denies POA/LW. Reports living at listed address with spouse. PCP is Dr. Martinez. [facility] is [facility] [address]. Pt denies hh/dme. Pt does dialysis at [facility] in [place] MWF 4:00pm. Pt reports being independent with ADLs and able to drive himself to apts. DC plan is home. CM following.     Pt transferred from [facility]. Pt reports that he does not have a ride home and only agreed to being transferred to this facility because he was told we would provide transportation home at discharge. CM informed him that we do not have transportation services and suggested him to start making arrangements with family or friends that could provide transportation home- CM also informed him that we could call cab, but he would have to pay for it. Pt verbalized understanding.      03/10/20 1035   Discharge Assessment   Assessment Type Discharge Planning Assessment   Confirmed/corrected address and phone number on facesheet? Yes   Assessment information obtained from? Patient   Communicated expected length of stay with patient/caregiver yes   Prior to hospitilization cognitive status: Alert/Oriented   Prior to hospitalization functional status: Independent   Current cognitive status: Alert/Oriented   Current Functional Status: Independent   Lives With spouse   Able to Return to Prior Arrangements yes   Is patient able to care for self after discharge? Yes   Patient's perception of discharge disposition home or selfcare   Readmission Within the Last 30 Days no previous admission in last 30 days   Patient currently being followed by outpatient case management? No   Patient currently receives any other outside agency services? No   Equipment Currently Used at Home none   Do you have any problems affording any of your prescribed medications? No   Is the patient taking medications as prescribed? yes   Does the  patient have transportation home? Yes   Transportation Anticipated family or friend will provide   Does the patient receive services at the [facility]mad[facility] Clinic? No   Discharge Plan A Home   DME Needed Upon Discharge  none   Patient/Family in Agreement with Plan yes

## 2020-03-10 NOTE — PROGRESS NOTES
03/10/20 1006        Wound 01/23/20 2100 Diabetic Ulcer Foot   Date First Assessed/Time First Assessed: 01/23/20 2100   Pre-existing: Yes  Primary Wound Type: Diabetic Ulcer  Side: Right  Location: Foot   Dressing Appearance Open to air   Drainage Amount Scant   Drainage Characteristics/Odor Brown   Appearance Red   Red (%), Wound Tissue Color 75 %   Yellow (%), Wound Tissue Color 25 %   Periwound Area Redness   Wound Edges Irregular   Wound Length (cm) 3 cm   Wound Width (cm) 2.5 cm   Wound Surface Area (cm^2) 7.5 cm^2

## 2020-03-10 NOTE — PLAN OF CARE
POC reviewed, AAO x 4, VSS, Glucose monitored, no complaints of pain, bed in low position, call light within reach, SR up x 2 instructed to call for assistance, free from falls, safety maintained, will continue to monitor and round.

## 2020-03-10 NOTE — ASSESSMENT & PLAN NOTE
Chronic problem  Chronic, controlled.  Latest blood pressure and vitals reviewed-   Temp:  [98.4 °F (36.9 °C)]   Pulse:  [71-82]   Resp:  [16]   BP: (149-160)/(78-98)   SpO2:  [98 %-99 %] .   Home meds for hypertension were reviewed and noted below. Hospital anti-hypertensive changes were made as shown below.    Will utilize p.r.n. blood pressure medication only if patient's blood pressure greater than  180/110 and he develops symptoms such as worsening chest pain or shortness of breath.     Home

## 2020-03-10 NOTE — SUBJECTIVE & OBJECTIVE
Past Medical History:   Diagnosis Date    Anemia     Asthma     Bacteremia 01/25/2020    Cellulitis of right foot     Cellulitis of right index finger     Chronic recurrent multifocal osteomyelitis of right foot     CKD (chronic kidney disease) stage 5, GFR less than 15 ml/min     Clotted renal dialysis AV graft     Diabetes mellitus     Diabetic foot ulcer     Dyspnea     Encounter for blood transfusion     ESRD (end stage renal disease) on dialysis     High cholesterol     History of group B Streptococcus (GBS) infection 03/07/2018    History of necrotising fasciitis     Hypertension     Hypokalemia     Osteomyelitis of left foot     PAD (peripheral artery disease)     Pyelonephritis     S/P transmetatarsal amputation of foot, right     Secondary lymphedema     Sepsis due to other specified Staphylococcus 01/25/2020    staph lugdunensis    Transfusion reaction     fever/hives       Past Surgical History:   Procedure Laterality Date    FOOT AMPUTATION Right     Partial    INSERTION OF CATHETER FOR HEMODIALYSIS Right 1/10/2019    Procedure: INSERTION, CATHETER, HEMODIALYSIS;  Surgeon: Salas Ryder MD;  [facility];  Service: General;  Laterality: Right;  RIGHT SUBCLAVIAN    INSERTION OF TUNNELED CENTRAL VENOUS HEMODIALYSIS CATHETER Left 01/28/2020    KNEE ARTHROSCOPY Right 2011    PERCUTANEOUS TRANSLUMINAL ANGIOPLASTY OF ARTERIOVENOUS FISTULA Right 10/22/2019    balloon angioplasty and stent    THIGH SURGERY Right     necrotising fasciitis    TOE AMPUTATION Left 03/2014    5th toe    TOTAL ANKLE ARTHROPLASTY Right 2010       Review of patient's allergies indicates:   Allergen Reactions    Amphotericin b Other (See Comments)    Bactrim [sulfamethoxazole-trimethoprim]     Lipitor [atorvastatin] Other (See Comments)     cough    Lisinopril Other (See Comments)     Cough      Percocet [oxycodone-acetaminophen] Itching    Codeine Rash       Current  Facility-Administered Medications on File Prior to Encounter   Medication    [DISCONTINUED] 0.9%  NaCl infusion (for blood administration)     Current Outpatient Medications on File Prior to Encounter   Medication Sig    ammonium lactate (LAC-HYDRIN) 12 % lotion Apply 1 application topically.    cinacalcet (SENSIPAR) 60 MG Tab     gabapentin (NEURONTIN) 100 MG capsule Take 100 mg by mouth.    sevelamer carbonate (RENVELA) 800 mg Tab Take 2 tablets (1,600 mg total) by mouth 3 (three) times daily with meals.     Family History     Problem Relation (Age of Onset)    Diabetes Mother, Father    Hypertension Mother, Father        Tobacco Use    Smoking status: Never Smoker    Smokeless tobacco: Never Used   Substance and Sexual Activity    Alcohol use: No    Drug use: No    Sexual activity: Yes     Partners: Female     Review of Systems   Constitutional: Positive for activity change and fatigue. Negative for chills, diaphoresis and fever.   HENT: Negative for congestion, nosebleeds and tinnitus.    Eyes: Negative for photophobia and visual disturbance.   Respiratory: Positive for shortness of breath. Negative for cough, chest tightness and wheezing.    Cardiovascular: Negative for chest pain, palpitations and leg swelling.   Gastrointestinal: Negative for abdominal distention, abdominal pain, constipation, diarrhea, nausea and vomiting.   Endocrine: Negative for cold intolerance and heat intolerance.   Genitourinary: Negative for difficulty urinating, dysuria, frequency, hematuria and urgency.   Musculoskeletal: Negative for arthralgias, back pain and myalgias.   Skin: Negative for pallor, rash and wound.   Allergic/Immunologic: Negative for immunocompromised state.   Neurological: Positive for weakness. Negative for dizziness, tremors, facial asymmetry and speech difficulty.   Hematological: Negative for adenopathy. Does not bruise/bleed easily.   Psychiatric/Behavioral: Negative for confusion and sleep  disturbance. The patient is not nervous/anxious.      Objective:     Vital Signs (Most Recent):    Vital Signs (24h Range):  Temp:  [98.4 °F (36.9 °C)] 98.4 °F (36.9 °C)  Pulse:  [71-82] 80  Resp:  [16] 16  SpO2:  [98 %-99 %] 99 %  BP: (149-160)/(78-98) 149/78        There is no height or weight on file to calculate BMI.    Physical Exam   Constitutional: He is oriented to person, place, and time. He appears well-developed and well-nourished. No distress.   HENT:   Head: Normocephalic.   Mouth/Throat: Oropharynx is clear and moist.   Eyes: Pupils are equal, round, and reactive to light. Conjunctivae are normal. No scleral icterus.   Neck: Normal range of motion. Neck supple. No JVD present.   Cardiovascular: Normal rate, regular rhythm, normal heart sounds and intact distal pulses. Exam reveals no gallop and no friction rub.   No murmur heard.  Pulmonary/Chest: Effort normal and breath sounds normal. No respiratory distress. He has no wheezes. He has no rales.   Abdominal: Soft. Bowel sounds are normal. He exhibits no distension. There is no tenderness. There is no rebound and no guarding.   Musculoskeletal: Normal range of motion. He exhibits no edema or tenderness.   Lymphadenopathy:     He has no cervical adenopathy.   Neurological: He is alert and oriented to person, place, and time. He displays normal reflexes. No cranial nerve deficit or sensory deficit. Coordination normal.   Skin: Skin is warm and dry. Capillary refill takes less than 2 seconds. No rash noted. He is not diaphoretic. No erythema. No pallor.   Psychiatric: He has a normal mood and affect. His behavior is normal. Judgment and thought content normal.   Nursing note and vitals reviewed.        CRANIAL NERVES     CN III, IV, VI   Pupils are equal, round, and reactive to light.       Significant Labs:   CBC:   Recent Labs   Lab 03/09/20  0912   WBC 5.70   HGB 7.0*   HCT 22.2*        CMP:   Recent Labs   Lab 03/09/20  1424      K 4.4       CO2 23      BUN 73*   CREATININE 15.2*   CALCIUM 8.9   PROT 8.1   ALBUMIN 3.7   BILITOT 0.9   ALKPHOS 66   AST 17   ALT 13*   ANIONGAP 17*   EGFRNONAA 3.6*       Significant Imaging: I have reviewed all pertinent imaging results/findings within the past 24 hours.

## 2020-03-10 NOTE — ASSESSMENT & PLAN NOTE
Chronic Problem  There is no height or weight on file to calculate BMI. Morbid obesity complicates all aspects of disease management from diagnostic modalities to treatment. Weight loss encouraged and health benefits explained to patient.

## 2020-03-10 NOTE — RESPIRATORY THERAPY
03/10/20 0800   PRE-TX-O2   O2 Device (Oxygen Therapy) room air   SpO2 100 %   Pulse Oximetry Type Intermittent   $ Pulse Oximetry - Multiple Charge Pulse Oximetry - Multiple   Pulse 84   Resp 16

## 2020-03-11 ENCOUNTER — TELEPHONE (OUTPATIENT)
Dept: MEDSURG UNIT | Facility: HOSPITAL | Age: 35
End: 2020-03-11

## 2020-03-11 NOTE — PLAN OF CARE
03/11/20 1046   Final Note   Assessment Type Final Discharge Note   Anticipated Discharge Disposition Home

## 2020-03-11 NOTE — PLAN OF CARE
Pt escorted to [facility] for ride home by CNA. NAD noted; pt ambulated with no assistance.

## 2020-06-08 PROBLEM — R78.81 BACTEREMIA: Status: ACTIVE | Noted: 2020-06-08

## 2020-06-09 PROBLEM — S91.302A OPEN WOUND OF LEFT FOOT: Chronic | Status: ACTIVE | Noted: 2020-06-09

## 2020-08-26 PROBLEM — L97.429 DIABETIC ULCER OF LEFT MIDFOOT ASSOCIATED WITH TYPE 2 DIABETES MELLITUS: Chronic | Status: ACTIVE | Noted: 2020-08-26

## 2020-08-26 PROBLEM — E11.621 DIABETIC ULCER OF LEFT MIDFOOT ASSOCIATED WITH TYPE 2 DIABETES MELLITUS: Chronic | Status: ACTIVE | Noted: 2020-08-26

## 2020-08-26 PROBLEM — M86.9 OSTEOMYELITIS OF LEFT FOOT: Status: ACTIVE | Noted: 2020-08-26

## 2020-08-28 PROBLEM — M86.9 OSTEOMYELITIS OF LEFT FOOT: Status: RESOLVED | Noted: 2020-08-26 | Resolved: 2020-08-28

## 2020-08-30 PROBLEM — R63.8 INCREASED NUTRITIONAL NEEDS: Status: ACTIVE | Noted: 2020-08-30

## 2020-09-01 ENCOUNTER — PATIENT OUTREACH (OUTPATIENT)
Dept: ADMINISTRATIVE | Facility: CLINIC | Age: 35
End: 2020-09-01

## 2020-09-01 NOTE — PATIENT INSTRUCTIONS
Wound Check, Infection  You have a wound that has become infected. The wound will not heal properly unless the infection is cleared. Infection in a wound may also spread if it is not treated. In most cases, antibiotic medicines are prescribed to treat a wound infection.   Symptoms of a wound infection include:  · Redness or swelling around the wound  · Warmth coming from the wound  · New or worsening pain  · Red streaks around the wound  · Draining pus  · Fever  Home care  Follow all directions you are given to treat the infection.  Medicines  Take all medicines as prescribed.   · If you were given antibiotics, take them until they are gone or your healthcare provider tells you to stop. It is vital to finish the antibiotics even if you feel better. If you do not finish them, the infection may come back and be harder to treat.  · If your infection is not responding to the medicines you are taking, you may be prescribed new medicines.  · Take medicine for pain as directed by your healthcare provider.  Wound care  Care for your wound as directed by your healthcare provider.  · Apply a warm compress (clean cloth soaked in hot water) to the infected area for about 5 to 10 minutes at a time. Be very careful not to burn yourself. Test the cloth on a non-infected area to make sure it is not too hot.  · Continue to change the dressing daily. If it becomes wet, stained with wound fluid, or dirty, change it sooner. To change it:  ¨ Wash your hands with soap and water before changing the dressing.  ¨ Carefully remove the dressing and tape. If it sticks to the wound, you may need to wet it a little to remove it. (Do not do this if your healthcare provider has told you not to.)  ¨ Gently clean the wound with clean water (or saline) using gauze, a clean washcloth, or cotton swab.  ¨ Do not use soap, alcohol, peroxide or other cleansers.  ¨ If you were told to dry the wound before putting on a new dressing, gently pat. Do not  rub.  ¨ Throw out the old dressing.  ¨ Wash your hands again before opening the new, clean dressing.  ¨ Wash your hands again when you are done.  Follow-up care  Follow up with your healthcare provider as advised. If a culture was done, you will be notified if your treatment needs to change. Call as directed for the results.  When to seek medical advice  Call your health care provider right away if any of these occur:  · Symptoms of infection don't start to improve within 2 days of starting antibiotics  · Symptoms of infection get worse  · New symptoms, such as red streaks around the wound  · Fever of 100.4°F (38.0°C) or higher for more than 2 days after starting the antibiotics  Date Last Reviewed: 8/10/2015  © 7107-3253 [employer]. [address] [place] [address], [place] [address]. All rights reserved. This information is not intended as a substitute for professional medical care. Always follow your healthcare professional's instructions.

## 2020-11-24 PROBLEM — E87.70 HYPERVOLEMIA: Status: ACTIVE | Noted: 2020-11-24

## 2020-11-24 PROBLEM — N18.6 ESRD ON HEMODIALYSIS: Chronic | Status: ACTIVE | Noted: 2020-11-24

## 2020-11-24 PROBLEM — Z91.199 NONADHERENCE TO MEDICAL TREATMENT: Chronic | Status: ACTIVE | Noted: 2020-11-24

## 2020-11-24 PROBLEM — I16.0 HYPERTENSIVE URGENCY: Status: ACTIVE | Noted: 2020-11-24

## 2020-11-24 PROBLEM — Z99.2 ESRD ON HEMODIALYSIS: Chronic | Status: ACTIVE | Noted: 2020-11-24

## 2020-11-25 PROBLEM — E87.70 HYPERVOLEMIA: Status: RESOLVED | Noted: 2020-11-24 | Resolved: 2020-11-25

## 2020-11-25 PROBLEM — I16.0 HYPERTENSIVE URGENCY: Status: RESOLVED | Noted: 2020-11-24 | Resolved: 2020-11-25

## 2021-03-10 PROBLEM — L03.116 CELLULITIS OF LEFT LOWER EXTREMITY: Status: ACTIVE | Noted: 2021-03-10

## 2021-03-11 PROBLEM — L03.119 CELLULITIS OF FOOT: Status: ACTIVE | Noted: 2018-03-08

## 2021-03-12 PROBLEM — L03.119 CELLULITIS OF FOOT: Status: RESOLVED | Noted: 2018-03-08 | Resolved: 2021-03-12

## 2021-04-28 DIAGNOSIS — Z20.2 STD EXPOSURE: Primary | ICD-10-CM

## 2021-04-28 DIAGNOSIS — E78.5 HYPERLIPIDEMIA, UNSPECIFIED HYPERLIPIDEMIA TYPE: Primary | ICD-10-CM

## 2021-04-28 DIAGNOSIS — E11.621 CONTROLLED TYPE 2 DIABETES MELLITUS WITH FOOT ULCER, WITH LONG-TERM CURRENT USE OF INSULIN: ICD-10-CM

## 2021-04-28 DIAGNOSIS — L97.509 CONTROLLED TYPE 2 DIABETES MELLITUS WITH FOOT ULCER, WITH LONG-TERM CURRENT USE OF INSULIN: ICD-10-CM

## 2021-04-28 DIAGNOSIS — Z79.4 CONTROLLED TYPE 2 DIABETES MELLITUS WITH FOOT ULCER, WITH LONG-TERM CURRENT USE OF INSULIN: ICD-10-CM

## 2021-06-10 DIAGNOSIS — M86.9 OSTEOMYELITIS OF LEFT FOOT, UNSPECIFIED TYPE: Primary | ICD-10-CM

## 2021-06-14 PROBLEM — R00.0 TACHYCARDIA: Status: ACTIVE | Noted: 2021-06-14

## 2021-06-15 PROBLEM — A41.9 SEVERE SEPSIS: Status: ACTIVE | Noted: 2021-06-15

## 2021-06-15 PROBLEM — N45.3 ORCHITIS AND EPIDIDYMITIS: Status: ACTIVE | Noted: 2021-06-15

## 2021-06-15 PROBLEM — R65.20 SEVERE SEPSIS: Status: ACTIVE | Noted: 2021-06-15

## 2021-06-16 PROBLEM — L97.411: Chronic | Status: ACTIVE | Noted: 2021-06-16

## 2021-06-21 PROBLEM — M86.072 ACUTE HEMATOGENOUS OSTEOMYELITIS OF LEFT FOOT: Status: ACTIVE | Noted: 2021-06-21

## 2021-06-21 PROBLEM — N45.3 ORCHITIS AND EPIDIDYMITIS: Status: RESOLVED | Noted: 2021-06-15 | Resolved: 2021-06-21

## 2021-06-21 PROBLEM — S91.302A OPEN WOUND OF LEFT FOOT: Chronic | Status: RESOLVED | Noted: 2020-06-09 | Resolved: 2021-06-21

## 2021-06-21 PROBLEM — R50.9 FEVER: Status: RESOLVED | Noted: 2019-01-14 | Resolved: 2021-06-21

## 2021-08-10 DIAGNOSIS — L03.116 CELLULITIS OF LEFT LOWER EXTREMITY: Primary | ICD-10-CM

## 2021-10-20 PROBLEM — N18.6 ACUTE RENAL FAILURE SUPERIMPOSED ON CHRONIC KIDNEY DISEASE, ON CHRONIC DIALYSIS: Status: RESOLVED | Noted: 2020-03-09 | Resolved: 2021-10-20

## 2021-10-20 PROBLEM — L97.411: Status: ACTIVE | Noted: 2021-06-16

## 2021-10-20 PROBLEM — I82.90 THROMBOSIS: Status: RESOLVED | Noted: 2020-01-25 | Resolved: 2021-10-20

## 2021-10-20 PROBLEM — N10 ACUTE PYELONEPHRITIS: Status: RESOLVED | Noted: 2018-05-15 | Resolved: 2021-10-20

## 2021-10-20 PROBLEM — A41.9 SEVERE SEPSIS: Status: RESOLVED | Noted: 2021-06-15 | Resolved: 2021-10-20

## 2021-10-20 PROBLEM — Z99.2 ACUTE RENAL FAILURE SUPERIMPOSED ON CHRONIC KIDNEY DISEASE, ON CHRONIC DIALYSIS: Status: RESOLVED | Noted: 2020-03-09 | Resolved: 2021-10-20

## 2021-10-20 PROBLEM — T14.8XXA WOUND INFECTION: Status: RESOLVED | Noted: 2019-11-05 | Resolved: 2021-10-20

## 2021-10-20 PROBLEM — L08.9 WOUND INFECTION: Status: RESOLVED | Noted: 2019-11-05 | Resolved: 2021-10-20

## 2021-10-20 PROBLEM — L02.619 ABSCESS OF FOOT: Status: RESOLVED | Noted: 2018-03-08 | Resolved: 2021-10-20

## 2021-10-20 PROBLEM — M86.9 OSTEOMYELITIS OF RIGHT FOOT: Status: RESOLVED | Noted: 2018-03-08 | Resolved: 2021-10-20

## 2021-10-20 PROBLEM — E11.621 DIABETIC ULCER OF LEFT MIDFOOT ASSOCIATED WITH TYPE 2 DIABETES MELLITUS: Chronic | Status: RESOLVED | Noted: 2020-08-26 | Resolved: 2021-10-20

## 2021-10-20 PROBLEM — L97.509 TYPE 2 DIABETES MELLITUS WITH FOOT ULCER, WITH LONG-TERM CURRENT USE OF INSULIN: Status: RESOLVED | Noted: 2018-03-07 | Resolved: 2021-10-20

## 2021-10-20 PROBLEM — A41.9 SEPSIS: Status: RESOLVED | Noted: 2020-01-25 | Resolved: 2021-10-20

## 2021-10-20 PROBLEM — Z79.4 TYPE 2 DIABETES MELLITUS WITH FOOT ULCER, WITH LONG-TERM CURRENT USE OF INSULIN: Status: RESOLVED | Noted: 2018-03-07 | Resolved: 2021-10-20

## 2021-10-20 PROBLEM — Z55.8 KNOWLEDGE DEFICIT ABOUT THERAPEUTIC DIET: Chronic | Status: RESOLVED | Noted: 2018-03-08 | Resolved: 2021-10-20

## 2021-10-20 PROBLEM — N18.6 ESRD (END STAGE RENAL DISEASE): Status: RESOLVED | Noted: 2019-01-14 | Resolved: 2021-10-20

## 2021-10-20 PROBLEM — R00.0 TACHYCARDIA: Status: RESOLVED | Noted: 2021-06-14 | Resolved: 2021-10-20

## 2021-10-20 PROBLEM — E66.9 OBESITY (BMI 30-39.9): Status: RESOLVED | Noted: 2020-03-09 | Resolved: 2021-10-20

## 2021-10-20 PROBLEM — Z78.9 KNOWLEDGE DEFICIT ABOUT THERAPEUTIC DIET: Chronic | Status: RESOLVED | Noted: 2018-03-08 | Resolved: 2021-10-20

## 2021-10-20 PROBLEM — L97.429 DIABETIC ULCER OF LEFT MIDFOOT ASSOCIATED WITH TYPE 2 DIABETES MELLITUS: Chronic | Status: RESOLVED | Noted: 2020-08-26 | Resolved: 2021-10-20

## 2021-10-20 PROBLEM — A49.1 GBS (GROUP B STREPTOCOCCUS) INFECTION: Status: RESOLVED | Noted: 2018-03-11 | Resolved: 2021-10-20

## 2021-10-20 PROBLEM — E11.621 TYPE 2 DIABETES MELLITUS WITH FOOT ULCER, WITH LONG-TERM CURRENT USE OF INSULIN: Status: RESOLVED | Noted: 2018-03-07 | Resolved: 2021-10-20

## 2021-10-20 PROBLEM — N17.9 ACUTE RENAL FAILURE SUPERIMPOSED ON CHRONIC KIDNEY DISEASE, ON CHRONIC DIALYSIS: Status: RESOLVED | Noted: 2020-03-09 | Resolved: 2021-10-20

## 2021-10-20 PROBLEM — L97.412 DIABETIC ULCER OF RIGHT MIDFOOT ASSOCIATED WITH TYPE 2 DIABETES MELLITUS, WITH FAT LAYER EXPOSED: Status: RESOLVED | Noted: 2018-03-08 | Resolved: 2021-10-20

## 2021-10-20 PROBLEM — E11.621 DIABETIC ULCER OF RIGHT MIDFOOT ASSOCIATED WITH TYPE 2 DIABETES MELLITUS, WITH FAT LAYER EXPOSED: Status: RESOLVED | Noted: 2018-03-08 | Resolved: 2021-10-20

## 2021-10-20 PROBLEM — R78.81 BACTEREMIA: Status: RESOLVED | Noted: 2020-06-08 | Resolved: 2021-10-20

## 2021-10-20 PROBLEM — E66.812 OBESITY, CLASS II, BMI 35-39.9: Chronic | Status: RESOLVED | Noted: 2018-03-08 | Resolved: 2021-10-20

## 2021-10-20 PROBLEM — T82.848A PAIN FROM A/V FISTULA: Status: RESOLVED | Noted: 2019-10-31 | Resolved: 2021-10-20

## 2021-10-20 PROBLEM — E66.9 OBESITY, CLASS II, BMI 35-39.9: Chronic | Status: RESOLVED | Noted: 2018-03-08 | Resolved: 2021-10-20

## 2021-10-20 PROBLEM — R65.20 SEVERE SEPSIS: Status: RESOLVED | Noted: 2021-06-15 | Resolved: 2021-10-20

## 2021-10-20 PROBLEM — M86.072 ACUTE HEMATOGENOUS OSTEOMYELITIS OF LEFT FOOT: Status: RESOLVED | Noted: 2021-06-21 | Resolved: 2021-10-20

## 2021-10-20 PROBLEM — L03.116 CELLULITIS OF LEFT LOWER EXTREMITY: Status: RESOLVED | Noted: 2021-03-10 | Resolved: 2021-10-20

## 2021-10-20 PROBLEM — D64.9 ANEMIA REQUIRING TRANSFUSIONS: Chronic | Status: RESOLVED | Noted: 2018-05-18 | Resolved: 2021-10-20

## 2022-05-30 ENCOUNTER — OFFICE VISIT (OUTPATIENT)
Dept: PODIATRY | Facility: CLINIC | Age: 37
End: 2022-05-30
Payer: MEDICARE

## 2022-05-30 VITALS
BODY MASS INDEX: 32.26 KG/M2 | WEIGHT: 238.19 LBS | HEART RATE: 92 BPM | HEIGHT: 72 IN | SYSTOLIC BLOOD PRESSURE: 134 MMHG | DIASTOLIC BLOOD PRESSURE: 84 MMHG

## 2022-05-30 DIAGNOSIS — L84 TYPE 2 DIABETES MELLITUS WITH PRESSURE CALLUS: ICD-10-CM

## 2022-05-30 DIAGNOSIS — E11.628 TYPE 2 DIABETES MELLITUS WITH PRESSURE CALLUS: ICD-10-CM

## 2022-05-30 DIAGNOSIS — Z89.431 S/P TRANSMETATARSAL AMPUTATION OF FOOT, RIGHT: Primary | ICD-10-CM

## 2022-05-30 DIAGNOSIS — L97.412 CHRONIC ULCER OF PLANTAR SURFACE OF MIDFOOT, RIGHT, WITH FAT LAYER EXPOSED: ICD-10-CM

## 2022-05-30 DIAGNOSIS — E08.42 DIABETIC POLYNEUROPATHY ASSOCIATED WITH DIABETES MELLITUS DUE TO UNDERLYING CONDITION: ICD-10-CM

## 2022-05-30 PROCEDURE — 99212 OFFICE O/P EST SF 10 MIN: CPT | Mod: PBBFAC,PN,25 | Performed by: PODIATRIST

## 2022-05-30 PROCEDURE — 97598 DBRDMT OPN WND ADDL 20CM/<: CPT | Mod: S$PBB,,, | Performed by: PODIATRIST

## 2022-05-30 PROCEDURE — 99213 PR OFFICE/OUTPT VISIT, EST, LEVL III, 20-29 MIN: ICD-10-PCS | Mod: 25,S$PBB,, | Performed by: PODIATRIST

## 2022-05-30 PROCEDURE — 97597 WOUND DEBRIDEMENT: ICD-10-PCS | Mod: S$PBB,,, | Performed by: PODIATRIST

## 2022-05-30 PROCEDURE — 99999 PR PBB SHADOW E&M-EST. PATIENT-LVL II: ICD-10-PCS | Mod: PBBFAC,,, | Performed by: PODIATRIST

## 2022-05-30 PROCEDURE — 97598 WOUND DEBRIDEMENT: ICD-10-PCS | Mod: S$PBB,,, | Performed by: PODIATRIST

## 2022-05-30 PROCEDURE — 99213 OFFICE O/P EST LOW 20 MIN: CPT | Mod: 25,S$PBB,, | Performed by: PODIATRIST

## 2022-05-30 PROCEDURE — 99999 PR PBB SHADOW E&M-EST. PATIENT-LVL II: CPT | Mod: PBBFAC,,, | Performed by: PODIATRIST

## 2022-05-30 PROCEDURE — 97597 DBRDMT OPN WND 1ST 20 CM/<: CPT | Mod: PBBFAC,PN | Performed by: PODIATRIST

## 2022-06-01 ENCOUNTER — TELEPHONE (OUTPATIENT)
Dept: WOUND CARE | Facility: CLINIC | Age: 37
End: 2022-06-01
Payer: MEDICARE

## 2022-06-01 ENCOUNTER — TELEPHONE (OUTPATIENT)
Dept: PODIATRY | Facility: CLINIC | Age: 37
End: 2022-06-01
Payer: MEDICARE

## 2022-06-01 NOTE — TELEPHONE ENCOUNTER
Called no answer - sent message to Dr. Colleen Garcia, provider, who saw patient on 5/30/2022.  This message was sent in error to wrong department and wrong facility.

## 2022-06-01 NOTE — TELEPHONE ENCOUNTER
----- Message from Manolo Betancourt sent at 6/1/2022  9:47 AM CDT -----  Regarding: PT appt  Contact: PT @ [phone]  Pt is returning a missed call from wound care. Please call pt.

## 2022-06-01 NOTE — TELEPHONE ENCOUNTER
----- Message from Colleen Garcia DPM sent at 6/1/2022  3:52 PM CDT -----  Regarding: RE: PT appt  Contact: PT @ [phone]  I think he is trying to contact our [facility] wound care center as he has missed multiple appointments there, our office will reach out to him and see if there is anything he needs.  Thank you    ----- Message -----  From: Debbie Sanz LPN  Sent: 6/1/2022   1:09 PM CDT  To: Colleen Garcia DPM  Subject: RE: PT appt                                      Good afternoon,    This patient was never contacted by our service because he has never been seen here.  The original message stated he was returning a missed called.  He saw your service recently on 5/30/2022 so, therefore, I sent the message to you thinking someone from your office called him. I don't know what is going on but, no one from [facility] in this office called this patient.    ----- Message -----  From: Colleen Garcia DPM  Sent: 6/1/2022  12:54 PM CDT  To: Debbie Sanz LPN  Subject: RE: PT appt                                      This was routed to [facility] incorrectly.  Seems like the call was to [facility] wound care as noted below    ----- Message -----  From: Debbie Sanz LPN  Sent: 6/1/2022  10:05 AM CDT  To: Colleen Garcia DPM  Subject: FW: PT appt                                      This message was routed to our department in correctly -     ----- Message -----  From: Manolo Betancourt  Sent: 6/1/2022   9:49 AM CDT  To: [facility]  Subject: PT appt                                          Pt is returning a missed call from wound care. Please call pt.

## 2022-06-01 NOTE — TELEPHONE ENCOUNTER
Spoke with patient. Pt was informed about appointment change day and time from original date. Pt verbalized understanding.

## 2022-06-02 NOTE — PROCEDURES
Wound Debridement    Date/Time: 5/30/2022 11:00 AM  Performed by: Colleen Garcia DPM  Authorized by: Colleen Garcia DPM       Wound Details:    Location:  Right foot    Location:  Right Midfoot    Type of Debridement:  Non-excisional       Length (cm):  6.5       Area (sq cm):  29.25       Width (cm):  4.5       Percent Debrided (%):  100       Depth (cm):  0.2       Total Area Debrided (sq cm):  29.25    Depth of debridement:  Epidermis/Dermis    Tissue debrided:  Epidermis and Dermis    Devitalized tissue debrided:  Callus and Biofilm    Instruments:  Blade, Nippers and Forceps    Bleeding:  Minimal  Hemostasis Achieved: Yes    Method Used:  Pressure  Patient tolerance:  Patient tolerated the procedure well with no immediate complications

## 2022-06-21 ENCOUNTER — TELEPHONE (OUTPATIENT)
Dept: PODIATRY | Facility: CLINIC | Age: 37
End: 2022-06-21

## 2022-06-21 NOTE — TELEPHONE ENCOUNTER
----- Message from Mart Mathis sent at 6/21/2022 11:00 AM CDT -----  Contact: @[phone]  Patient calling to re-schedule the 6-21st appointment, ( due to car issues )  please call to advise

## 2022-08-24 PROBLEM — M54.2 NECK PAIN: Status: ACTIVE | Noted: 2022-08-24

## 2022-09-16 DIAGNOSIS — I70.261 ATHEROSCLEROSIS OF NATIVE ARTERIES OF EXTREMITIES WITH GANGRENE, RIGHT LEG: Primary | ICD-10-CM

## 2023-03-04 PROBLEM — I73.9 PAD (PERIPHERAL ARTERY DISEASE): Chronic | Status: ACTIVE | Noted: 2023-03-04

## 2023-03-04 PROBLEM — K52.9 COLITIS: Status: ACTIVE | Noted: 2023-03-04

## 2023-03-04 PROBLEM — K52.9 ENTERITIS: Status: ACTIVE | Noted: 2023-03-04

## 2023-03-06 PROBLEM — A41.9 SEPSIS WITHOUT ACUTE ORGAN DYSFUNCTION: Status: ACTIVE | Noted: 2023-03-06

## 2023-03-07 PROBLEM — A41.9 SEPSIS WITHOUT ACUTE ORGAN DYSFUNCTION: Status: RESOLVED | Noted: 2023-03-06 | Resolved: 2023-03-07

## 2023-04-21 PROBLEM — J18.9 PNEUMONIA DUE TO INFECTIOUS ORGANISM: Status: ACTIVE | Noted: 2023-04-21

## 2023-04-22 PROBLEM — R07.9 CHEST PAIN: Status: ACTIVE | Noted: 2023-04-22

## 2023-04-23 PROBLEM — G93.40 ENCEPHALOPATHY: Status: ACTIVE | Noted: 2023-04-23

## 2023-04-25 PROBLEM — R07.9 CHEST PAIN: Status: RESOLVED | Noted: 2023-04-22 | Resolved: 2023-04-25

## 2023-04-25 PROBLEM — I12.9 HYPERTENSIVE NEPHROPATHY: Status: ACTIVE | Noted: 2023-04-25

## 2023-04-26 PROBLEM — A41.9 SEPSIS: Status: RESOLVED | Noted: 2020-01-25 | Resolved: 2023-04-26

## 2023-04-26 PROBLEM — G93.40 ENCEPHALOPATHY: Status: RESOLVED | Noted: 2023-04-23 | Resolved: 2023-04-26

## 2023-04-26 PROBLEM — I10 PRIMARY HYPERTENSION: Status: ACTIVE | Noted: 2018-03-08

## 2023-04-28 ENCOUNTER — PATIENT OUTREACH (OUTPATIENT)
Dept: ADMINISTRATIVE | Facility: CLINIC | Age: 38
End: 2023-04-28
Payer: MEDICARE

## 2023-04-28 NOTE — PROGRESS NOTES
C3 nurse attempted to contact Brenda Huerta  for a TCC post hospital discharge follow up call. No answer. Left voicemail with callback information. The patient does not have a scheduled HOSFU appointment. Message sent to PCP staff for assistance with scheduling visit with patient.

## 2023-04-28 NOTE — PROGRESS NOTES
C3 nurse spoke with Brenda Huerta' wife Jessica for a TCC post hospital discharge follow up call. The patient has a scheduled HOSFU appointment with Kaley Aleman on 05/03/23 @ 0800.

## 2023-05-04 ENCOUNTER — OFFICE VISIT (OUTPATIENT)
Dept: HOME HEALTH SERVICES | Facility: CLINIC | Age: 38
End: 2023-05-04
Payer: MEDICARE

## 2023-05-04 DIAGNOSIS — N18.6 ESRD ON HEMODIALYSIS: Chronic | ICD-10-CM

## 2023-05-04 DIAGNOSIS — G93.40 ENCEPHALOPATHY: ICD-10-CM

## 2023-05-04 DIAGNOSIS — R13.10 DYSPHAGIA, UNSPECIFIED TYPE: ICD-10-CM

## 2023-05-04 DIAGNOSIS — I12.9 HYPERTENSIVE NEPHROPATHY: ICD-10-CM

## 2023-05-04 DIAGNOSIS — J18.9 PNEUMONIA DUE TO INFECTIOUS ORGANISM, UNSPECIFIED LATERALITY, UNSPECIFIED PART OF LUNG: ICD-10-CM

## 2023-05-04 DIAGNOSIS — I73.9 PAD (PERIPHERAL ARTERY DISEASE): Chronic | ICD-10-CM

## 2023-05-04 DIAGNOSIS — Z99.2 ESRD ON HEMODIALYSIS: Chronic | ICD-10-CM

## 2023-05-04 DIAGNOSIS — R63.8 INCREASED NUTRITIONAL NEEDS: ICD-10-CM

## 2023-05-04 DIAGNOSIS — L97.411 ULCER OF RIGHT MIDFOOT LIMITED TO BREAKDOWN OF SKIN: Chronic | ICD-10-CM

## 2023-05-04 PROCEDURE — 99495 TRANSJ CARE MGMT MOD F2F 14D: CPT | Mod: S$GLB,,, | Performed by: NURSE PRACTITIONER

## 2023-05-04 PROCEDURE — 99497 PR ADVNCD CARE PLAN 30 MIN: ICD-10-PCS | Mod: S$GLB,,, | Performed by: NURSE PRACTITIONER

## 2023-05-04 PROCEDURE — 99497 ADVNCD CARE PLAN 30 MIN: CPT | Mod: S$GLB,,, | Performed by: NURSE PRACTITIONER

## 2023-05-04 PROCEDURE — 99495 TCM SERVICES (MODERATE COMPLEXITY): ICD-10-PCS | Mod: S$GLB,,, | Performed by: NURSE PRACTITIONER

## 2023-05-08 VITALS
HEART RATE: 79 BPM | OXYGEN SATURATION: 98 % | RESPIRATION RATE: 18 BRPM | SYSTOLIC BLOOD PRESSURE: 126 MMHG | TEMPERATURE: 98 F | DIASTOLIC BLOOD PRESSURE: 88 MMHG

## 2023-05-08 PROBLEM — R13.10 DYSPHAGIA: Status: ACTIVE | Noted: 2023-05-08

## 2023-05-08 NOTE — ASSESSMENT & PLAN NOTE
Recent PTA per spouse 2/18 with bleeding and complications post procedure  Now stable  continue statin

## 2023-05-08 NOTE — ASSESSMENT & PLAN NOTE
spouse reports poor appetite  Encouraged nutritional supplement, small frequent meals  Encourage caregiver support

## 2023-05-08 NOTE — ASSESSMENT & PLAN NOTE
Spouse reports improving but not back at baseline  Instructed spouse to continue to monitor and offer support, report worsening symptoms

## 2023-05-08 NOTE — PROGRESS NOTES
"  [facility]  Transition of Care Home Visit    Visit Date: 5/4/2023  Encounter Provider: Arnaldo Aleman NP  PCP:  Davy Martinez MD    PRESENTING HISTORY      Patient ID: Brenda Heurta 37 y.o. male.    Consult Requested By:  No ref. provider found  Reason for Consult:  Hospital Follow Up    Chief Complaint: Hospital Follow Up    The patient is being seen at home due to a physical debility that presents a taxing effort to leave the home, to mitigate high risk of hospital readmission or due to the limited availability of reliable or safe options for transportation to the point of access to the provider. The visit meets the criteria for medical necessity as defined by CMS as "health-care services needed to prevent, diagnose, or treat an illness, injury, condition, disease, or its symptoms and that meet accepted standards of medicine." Prior to treatment on this visit the chart was reviewed and patient consent was obtained.    HPI:   This is a 37 year old AAM with a history as below who presents to the ED at my direction after his spouse called me to report acute onset unmeasured fever with associated hypoxia (88% on RA at home) and AMS. Patient was altered and thus was not very contributory to HPI. Per spouse, she had been working excessively and did not notice any changed in him until PTA. He often omits his TIW chronic HD treatment and presents to chronic HD unit once or twice a week. Last HD yesterday per Wednesday per spouse for full treatment. He also went to wound care 1 day PTA. Spouse denies him to have been complaining of any abdominal pain, chest pain, diarrhea, body aches, wound changes. Spouse denies to have noticed any change or foul drainage from foot wound. He was found in the ED to have fever of 103F, 20,000 leukocytosis on CBC, baseline CMP, normal lactic acid 0.9, negative SARS, negative flu,chronic foot xray without worsening bony abnormalities, interstitial bilateral lung pattern or " "infiltrates on CXR, and SBP > 220. He was administered a dose of Vancomycin and admitted to the telemetry unit. Blood cultures were also collected. On rounds today, he remains mildly volume overloaded and altered though he awakens to verbal stimuli and interacts with me upon awakening. Renal consulted for HD needs. Cultures pending.           Hospital Course:   Admitted with AMS and fevers  SBP on presentation > 200  Blood cultures collected  Started on Zosyn and Zyvox  Renal and cards consulted   More altered today (4/22)  Left arm tremors reports overnight  Neuro consult placed  CTH performed without acute pathology  MRI brain requested   MRI without acute infarct  Neurology eval complete  BP med adjustment ongoing   Spouse reports a "rough night" with confusion, dyspnea, and refractory hypertension  Antihypertensives adjusted, wean Clonidine to stop (may be adding to confusion)  Polymicrobial wound infection per cultures with Streptococcus, Enterobacter, and Proteus  Ceftriaxone changed to Cipro and Zyvox   OP HD unit gave his chair spot up for tomorrow  Dc planning for tomorrow after HD   He is stable and will discharge home with recommendations for TIW HD and PCP follow up       Today:  Mr. Brenda Huerta is a 37 y.o. male is being seen and examined at home today for transitional care visit to the home environment post-discharge from inpatient hospitalization encounter described above. Brenda presents with improved mental state but not back to baseline yet, according to spouse. Denies fevers, chills, chest pain, SOB, worsening mental state, cough, congestion.  Medications reviewed and reconciled. Reports taking as prescribed. He lives with spouse and dependent children.  Spouse reports he was experiencing difficulty swallowing when he was first released from the hospital but that has now resolved.  Reports poor appetite, spouse states he goes "hours" without eating and drinking.  VSS on exam.  No signs of acute " distress.  He is found resting on sofa, answers questions appropriately but has delayed response.  Spouse states that is an improvement.  He follows with PCP Aria Cadet and reports that they are in close contact with her.  He also reports compliance with HD schedule and [facility] outpatient woundcare. Risks of environmental exposure to coronavirus discussed including: social distancing, hand hygiene, and limiting departures from the home for necessities only.  Reports understanding and willingness to comply.     Attestation: Screening criteria to assess the level of the patient's risk for infection with COVID-19 as recommended by the CDC at the time of the above documented home visit concluded appropriateness to proceed. Universal precautions were maintained at all times, including provider use of >60% alcohol gel hand  immediately prior to entry and upon departing the patient's home as well as cleaning of equipment used in home visit with antibacterial/germicidal disposable wipes.    Admission Date: 4/21/2023  Hospital Length of Stay: 6 days  Discharge Date and Time: 4/27/2023 12:46 PM  _________________________________________________________________    Review of Systems   Constitutional:  Negative for chills and fever.   HENT:  Negative for congestion, postnasal drip and rhinorrhea.    Eyes:  Negative for visual disturbance.   Respiratory:  Negative for chest tightness and shortness of breath.    Cardiovascular:  Negative for chest pain and palpitations.   Gastrointestinal:  Negative for abdominal pain, diarrhea and vomiting.   Genitourinary:  Negative for difficulty urinating.   Musculoskeletal:  Negative for arthralgias and myalgias.   Skin:  Positive for wound. Negative for color change.   Neurological:  Negative for dizziness, weakness and light-headedness.   Hematological:  Does not bruise/bleed easily.   Psychiatric/Behavioral:  Negative for agitation.      Assessments:  Environmental: single story  home, no steps to enter, adequate lighting and temperature control  Functional Status:  continent of bowel and bladder  Safety: Fall Precautions, COVID Precautions/Social Distancing/Mask Use  Nutritional: Adequate  Home Health: none    Ethical / Legal: Advance Care Planning   Capacity to make medical decisions:  no, Conflict no  Surrogate decision maker:  Oscar Lopez, Relationship: spouse  Advance Directives:  no  HCPOA: no  [place]:  no  Code Status:  full code    Advance Care Planning/Goals of Care:  Advanced Care Directives, HCPOA and [place] forms left in the home for family review, discussion and signing with instructions to return upon their next provider encounter for inclusion to the medical record. Discussed Advance Care Planning for 20 minutes.    PAST HISTORY:     Past Medical History:   Diagnosis Date    Anemia     Asthma     Bacteremia 01/25/2020    Cellulitis of right foot     Cellulitis of right index finger     Chronic recurrent multifocal osteomyelitis of right foot     CKD (chronic kidney disease) stage 5, GFR less than 15 ml/min     Clotted renal dialysis AV graft     Diabetes mellitus     Diabetic foot ulcer     Dyspnea     Encounter for blood transfusion     ESRD (end stage renal disease) on dialysis     High cholesterol     History of group B Streptococcus (GBS) infection 03/07/2018    History of necrotising fasciitis     Hypertension     Hypokalemia     Osteomyelitis of left foot     PAD (peripheral artery disease)     Pyelonephritis     S/P transmetatarsal amputation of foot, right     Secondary lymphedema     Sepsis due to other specified Staphylococcus 01/25/2020    staph lugdunensis    Transfusion reaction     fever/hives    Ulcer of right midfoot limited to breakdown of skin 6/16/2021       Past Surgical History:   Procedure Laterality Date    FOOT AMPUTATION Right     Partial    INSERTION OF CATHETER FOR HEMODIALYSIS Right 1/10/2019    Procedure: INSERTION, CATHETER, HEMODIALYSIS;   Surgeon: Salas Ryder MD;  Location: [facility];  Service: General;  Laterality: Right;  RIGHT SUBCLAVIAN    INSERTION OF TUNNELED CENTRAL VENOUS HEMODIALYSIS CATHETER Left 01/28/2020    KNEE ARTHROSCOPY Right 2011    PERCUTANEOUS TRANSLUMINAL ANGIOPLASTY OF ARTERIOVENOUS FISTULA Right 10/22/2019    balloon angioplasty and stent    THIGH SURGERY Right     necrotising fasciitis    TOE AMPUTATION Left 03/2014    5th toe    TOTAL ANKLE ARTHROPLASTY Right 2010       Family History   Problem Relation Age of Onset    Diabetes Mother     Hypertension Mother     Diabetes Father     Hypertension Father        Social History     Socioeconomic History    Marital status:    Tobacco Use    Smoking status: Never    Smokeless tobacco: Never   Substance and Sexual Activity    Alcohol use: No    Drug use: No    Sexual activity: Yes     Partners: Female     Social Determinants of Health     Financial Resource Strain: Low Risk     Difficulty of Paying Living Expenses: Not hard at all   Food Insecurity: No Food Insecurity    Worried About Running Out of Food in the Last Year: Never true    Ran Out of Food in the Last Year: Never true   Transportation Needs: No Transportation Needs    Lack of Transportation (Medical): No    Lack of Transportation (Non-Medical): No   Physical Activity: Inactive    Days of Exercise per Week: 3 days    Minutes of Exercise per Session: 0 min   Stress: No Stress Concern Present    Feeling of Stress : Only a little   Social Connections: Moderately Isolated    Frequency of Communication with Friends and Family: More than three times a week    Frequency of Social Gatherings with Friends and Family: More than three times a week    Attends Judaism Services: Never    Active Member of Clubs or Organizations: No    Attends Club or Organization Meetings: Never    Marital Status:    Housing Stability: Low Risk     Unable to Pay for Housing in the Last Year: No    Number of Places Lived in the  Last Year: 1    Unstable Housing in the Last Year: No       MEDICATIONS & ALLERGIES:     Current Outpatient Medications on File Prior to Visit   Medication Sig Dispense Refill    ALPRAZolam (XANAX) 0.5 MG tablet Take 1 tablet (0.5 mg total) by mouth 2 (two) times a day. 60 tablet 0    ammonium lactate (LAC-HYDRIN) 12 % lotion Apply topically 2 (two) times daily. 225 g 3    baclofen (LIORESAL) 10 MG tablet Take 1 tablet (10 mg total) by mouth 3 (three) times daily. 90 tablet 11    clopidogreL (PLAVIX) 75 mg tablet Take 1 tablet (75 mg total) by mouth once daily. 30 tablet 11    docusate sodium (COLACE) 100 MG capsule Take 2 capsules (200 mg total) by mouth 2 (two) times daily. 60 capsule 3    epoetin abel-epbx (RETACRIT) 10,000 unit/mL imjection Inject 1 mL (10,000 Units total) into the skin every Tues, Thurs, Sat. 12 mL 3    hydrALAZINE (APRESOLINE) 100 MG tablet Take 1 tablet (100 mg total) by mouth 2 (two) times a day. 60 tablet 11    linezolid (ZYVOX) 600 mg Tab Take 1 tablet (600 mg total) by mouth every 12 (twelve) hours. 60 tablet 3    losartan (COZAAR) 100 MG tablet Take 1 tablet (100 mg total) by mouth once daily. 90 tablet 3    metoprolol succinate (TOPROL-XL) 100 MG 24 hr tablet Take 1 tablet (100 mg total) by mouth once daily. 30 tablet 11    NIFEdipine (PROCARDIA-XL) 90 MG (OSM) 24 hr tablet Take 1 tablet (90 mg total) by mouth once daily. 30 tablet 11    pantoprazole (PROTONIX) 40 MG tablet Take 1 tablet (40 mg total) by mouth once daily. 30 tablet 11    sertraline (ZOLOFT) 50 MG tablet Take 1 tablet (50 mg total) by mouth once daily. 30 tablet 11    sodium zirconium cyclosilicate (LOKELMA) 10 gram packet Take 1 packet (10 g total) by mouth every Mon, Wed, Fri. Mix entire contents of packet(s) into drinking glass containing 3 tablespoons of water; stir well and drink immediately. Add water and repeat until no powder remains to receive entire dose. 12 packet 3    [DISCONTINUED] zolpidem (AMBIEN) 10 mg  Tab Take 10 mg by mouth nightly as needed.       No current facility-administered medications on file prior to visit.        Review of patient's allergies indicates:   Allergen Reactions    Mariposa oleifera seed oil Shortness Of Breath    Dextromethorphan-guaifenesin      Other reaction(s): Skin Rash    Amphotericin b Other (See Comments)    Bactrim [sulfamethoxazole-trimethoprim]     Lipitor [atorvastatin] Other (See Comments)     cough    Lisinopril Other (See Comments)     Cough      Codeine Rash       OBJECTIVE:     Vital Signs:  Vitals:    05/04/23 1400   BP: 126/88   Pulse: 79   Resp: 18   Temp: 98 °F (36.7 °C)     There is no height or weight on file to calculate BMI.       Physical Exam  HENT:      Head: Normocephalic and atraumatic.      Nose: No congestion or rhinorrhea.      Mouth/Throat:      Mouth: Mucous membranes are moist.   Cardiovascular:      Rate and Rhythm: Normal rate.   Pulmonary:      Effort: No respiratory distress.      Breath sounds: Normal breath sounds.   Abdominal:      General: Bowel sounds are normal.      Palpations: Abdomen is soft.   Musculoskeletal:      Cervical back: Normal range of motion and neck supple.      Right lower leg: No edema.      Left lower leg: No edema.   Skin:     General: Skin is warm and dry.      Comments: Right foot wound dressed    Neurological:      Mental Status: He is alert.      Comments: Confused at times      Laboratory  Lab Results   Component Value Date    WBC 6.78 04/27/2023    HGB 9.6 (L) 04/27/2023    HCT 33.4 (L) 04/27/2023    MCV 85 04/27/2023     04/27/2023     Lab Results   Component Value Date    INR 1.1 08/26/2020    INR 1.0 03/07/2018     Lab Results   Component Value Date    HGBA1C 4.5 04/28/2021     No results for input(s): POCTGLUCOSE in the last 72 hours.    TRANSITION OF CARE:     [facility] Contact Note: 4/28/23    Family and/or Caretaker present at visit?  Yes.  Diagnostic tests reviewed/disposition: No diagnosic tests  pending after this hospitalization.  Disease/illness education: Importance of Compliance with all prescribed medications and treatments, COVID Precautions/Social Distancing/Mask Use  Home health/community services discussion/referrals: Patient does not have home health established from hospital visit.  They do not need home health.  If needed, we will set up home health for the patient.   Establishment or re-establishment of referral orders for community resources: No other necessary community resources.   Discussion with other health care providers: No discussion with other health care providers necessary.     Transition of Care Visit:  I have reviewed and updated the history and problem list. I have reconciled the medication list. I have discussed the hospitalization and current medical issues, prognosis and plans with the patient/family.     Medications Reconciliation:   I have reconciled the patient's home medications and discharge medications with the patient/family. I have updated all changes. Refer to After-Visit Medication List.    Discharge plans, follow-up instructions, future appointments, provider contact information, indicators to seek emergency treatment and encouragement to call for any questions, concerns or clarification of the patient's plan of care explained to patient and/or caregiver(s), whom confirm understanding of provided information and endorse willingness to comply.     ASSESSMENT & PLAN:     HIGH RISK CONDITION(S):  Patient has a condition that poses threat to life and bodily function: s/p recent hospitalization sepsis.       1. ESRD on hemodialysis  Assessment & Plan:  No acute issues  Continue HD as scheduled  Continue to follow with nephrology       2. Ulcer of right midfoot limited to breakdown of skin  Assessment & Plan:  Wound dressed  Receives outpatient woundcare at [facility]-continue  Continue zyvox to complete course       3. PAD (peripheral artery disease)  Assessment &  Plan:  Recent PTA per spouse 2/18 with bleeding and complications post procedure  Now stable  continue statin      4. Increased nutritional needs  Assessment & Plan:  spouse reports poor appetite  Encouraged nutritional supplement, small frequent meals  Encourage caregiver support       5. Pneumonia due to infectious organism, unspecified laterality, unspecified part of lung  Assessment & Plan:  Stable  No acute respiratory symptoms  Continue zyvox to complete course       6. Encephalopathy  Assessment & Plan:  Spouse reports improving but not back at baseline  Instructed spouse to continue to monitor and offer support, report worsening symptoms       7. Hypertensive nephropathy  Overview:  HTN- BP better controlled since admit on - beta blocker, hydralazine, ARB, Ca blocker and hydralazine.  Cont to titrate and adjust as needed for BP control.    Assessment & Plan:  Stable  Continue meds as prescribed  Continue caregiver support  Continue to follow with PCP       8. Dysphagia, unspecified type  Assessment & Plan:  Spouse reported that he was having difficulty swallow when first released from hospital, now resolved  instructed to report return of symptoms  Discussed aspiration precautions                Encounter for Support and Coordination of Transition of Care     Instructions:  - Continue all medications, treatments and therapies as ordered.   - Follow all instructions, recommendations as discussed.  - Maintain Safety Precautions at all times.  - Attend all medical appointments as scheduled.  - For worsening symptoms: call Primary Care Physician or Nurse Practitioner.  - For emergencies, call 911 or immediately report to the nearest emergency room.  - Limit Risks of environmental exposure to coronavirus/COVID-19 as discussed including: social distancing, hand hygiene, and limiting departures from the home for necessities only.        Were controlled substances prescribed?  No      Scheduled Follow-up :  No  future appointments.    After Visit Medication List :     Medication List            Accurate as of May 4, 2023 11:59 PM. If you have any questions, ask your nurse or doctor.                CONTINUE taking these medications      ALPRAZolam 0.5 MG tablet  Commonly known as: XANAX  Take 1 tablet (0.5 mg total) by mouth 2 (two) times a day.     ammonium lactate 12 % lotion  Commonly known as: LAC-HYDRIN  Apply topically 2 (two) times daily.     baclofen 10 MG tablet  Commonly known as: LIORESAL  Take 1 tablet (10 mg total) by mouth 3 (three) times daily.     clopidogreL 75 mg tablet  Commonly known as: PLAVIX  Take 1 tablet (75 mg total) by mouth once daily.     docusate sodium 100 MG capsule  Commonly known as: COLACE  Take 2 capsules (200 mg total) by mouth 2 (two) times daily.     epoetin abel-epbx 10,000 unit/mL imjection  Commonly known as: RETACRIT  Inject 1 mL (10,000 Units total) into the skin every Tues, Thurs, Sat.     hydrALAZINE 100 MG tablet  Commonly known as: APRESOLINE  Take 1 tablet (100 mg total) by mouth 2 (two) times a day.     linezolid 600 mg Tab  Commonly known as: ZYVOX  Take 1 tablet (600 mg total) by mouth every 12 (twelve) hours.     losartan 100 MG tablet  Commonly known as: COZAAR  Take 1 tablet (100 mg total) by mouth once daily.     metoprolol succinate 100 MG 24 hr tablet  Commonly known as: TOPROL-XL  Take 1 tablet (100 mg total) by mouth once daily.     NIFEdipine 90 MG (OSM) 24 hr tablet  Commonly known as: PROCARDIA-XL  Take 1 tablet (90 mg total) by mouth once daily.     pantoprazole 40 MG tablet  Commonly known as: PROTONIX  Take 1 tablet (40 mg total) by mouth once daily.     sertraline 50 MG tablet  Commonly known as: ZOLOFT  Take 1 tablet (50 mg total) by mouth once daily.     sodium zirconium cyclosilicate 10 gram packet  Commonly known as: Lokelma  Take 1 packet (10 g total) by mouth every Mon, Wed, Fri. Mix entire contents of packet(s) into drinking glass containing 3  tablespoons of water; stir well and drink immediately. Add water and repeat until no powder remains to receive entire dose.              Patient consent was obtained prior to treatment on this visit.    Attestation: Screening criteria to assess the level of the patient's risk for infection with COVID-19 as recommended by the CDC at the time of the above documented home visit concluded appropriateness to proceed. Universal precautions were maintained at all times, including provider use of >60% alcohol gel hand  immediately prior to entry and upon departing the patient's home as well as cleaning of equipment used in home visit with antibacterial/germicidal disposable wipes.     Signature:       RUTH Johnson-RENARD   [facility]    Total Face-to-Face Encounter: 60 minutes with >50% of time spent discussing the care with the patient/family.

## 2023-05-08 NOTE — ASSESSMENT & PLAN NOTE
Spouse reported that he was having difficulty swallow when first released from hospital, now resolved  instructed to report return of symptoms  Discussed aspiration precautions

## 2023-05-08 NOTE — ASSESSMENT & PLAN NOTE
Wound dressed  Receives outpatient woundcare at [facility]-continue  Continue zyvox to complete course

## 2023-07-24 PROBLEM — J18.9 PNEUMONIA DUE TO INFECTIOUS ORGANISM: Status: RESOLVED | Noted: 2023-04-21 | Resolved: 2023-07-24

## 2023-08-24 PROBLEM — S91.301A WOUND OF RIGHT FOOT: Status: ACTIVE | Noted: 2018-03-08

## 2023-08-24 PROBLEM — G47.01 INSOMNIA DUE TO MEDICAL CONDITION: Status: ACTIVE | Noted: 2023-08-24

## 2023-08-24 PROBLEM — M54.50 ACUTE MIDLINE LOW BACK PAIN WITHOUT SCIATICA: Status: ACTIVE | Noted: 2023-08-24

## 2023-09-18 ENCOUNTER — PATIENT MESSAGE (OUTPATIENT)
Dept: PEDIATRICS | Facility: CLINIC | Age: 38
End: 2023-09-18
Payer: MEDICARE

## 2023-10-17 ENCOUNTER — PATIENT MESSAGE (OUTPATIENT)
Dept: PODIATRY | Facility: CLINIC | Age: 38
End: 2023-10-17
Payer: MEDICARE

## 2023-10-18 PROBLEM — I10 MALIGNANT HYPERTENSION: Status: ACTIVE | Noted: 2023-10-18

## 2023-10-18 PROBLEM — I67.4 HYPERTENSIVE ENCEPHALOPATHY SYNDROME: Status: ACTIVE | Noted: 2023-10-18

## 2023-10-21 PROBLEM — I67.4 HYPERTENSIVE ENCEPHALOPATHY SYNDROME: Status: RESOLVED | Noted: 2023-10-18 | Resolved: 2023-10-21

## 2024-02-27 PROBLEM — N18.9 HISTORY OF ANEMIA DUE TO CKD: Status: ACTIVE | Noted: 2024-02-27

## 2024-02-27 PROBLEM — Z86.2 HISTORY OF ANEMIA DUE TO CKD: Status: ACTIVE | Noted: 2024-02-27

## 2024-02-27 PROBLEM — L97.509 CHRONIC DIABETIC ULCER OF FOOT DETERMINED BY EXAMINATION: Status: ACTIVE | Noted: 2020-08-26

## 2024-02-27 PROBLEM — E11.621 CHRONIC DIABETIC ULCER OF FOOT DETERMINED BY EXAMINATION: Status: ACTIVE | Noted: 2020-08-26
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
## 2024-03-05 ENCOUNTER — OFFICE VISIT (OUTPATIENT)
Dept: PODIATRY | Facility: CLINIC | Age: 39
End: 2024-03-05
Payer: MEDICARE

## 2024-03-05 VITALS
DIASTOLIC BLOOD PRESSURE: 67 MMHG | BODY MASS INDEX: 31.92 KG/M2 | HEART RATE: 101 BPM | WEIGHT: 235.69 LBS | SYSTOLIC BLOOD PRESSURE: 120 MMHG | HEIGHT: 72 IN

## 2024-03-05 DIAGNOSIS — I97.89 NECROSIS OF SURGICAL WOUND: ICD-10-CM

## 2024-03-05 DIAGNOSIS — E08.42 DIABETIC POLYNEUROPATHY ASSOCIATED WITH DIABETES MELLITUS DUE TO UNDERLYING CONDITION: ICD-10-CM

## 2024-03-05 DIAGNOSIS — L02.612 ABSCESS OF LEFT FOOT: ICD-10-CM

## 2024-03-05 DIAGNOSIS — M86.172 OTHER ACUTE OSTEOMYELITIS OF LEFT FOOT: ICD-10-CM

## 2024-03-05 DIAGNOSIS — I73.9 PAD (PERIPHERAL ARTERY DISEASE): Chronic | ICD-10-CM

## 2024-03-05 DIAGNOSIS — N18.6 END STAGE RENAL DISEASE ON DIALYSIS: ICD-10-CM

## 2024-03-05 DIAGNOSIS — I96 NECROSIS OF SURGICAL WOUND: ICD-10-CM

## 2024-03-05 DIAGNOSIS — G89.18 POSTOPERATIVE PAIN: Primary | ICD-10-CM

## 2024-03-05 DIAGNOSIS — L97.422 ULCER OF LEFT HEEL, WITH FAT LAYER EXPOSED: ICD-10-CM

## 2024-03-05 DIAGNOSIS — S91.301A WOUND OF RIGHT FOOT: ICD-10-CM

## 2024-03-05 DIAGNOSIS — Z89.431 S/P TRANSMETATARSAL AMPUTATION OF FOOT, RIGHT: ICD-10-CM

## 2024-03-05 DIAGNOSIS — Z99.2 END STAGE RENAL DISEASE ON DIALYSIS: ICD-10-CM

## 2024-03-05 DIAGNOSIS — Z91.199 NONADHERENCE TO MEDICAL TREATMENT: Chronic | ICD-10-CM

## 2024-03-05 PROCEDURE — 99999 PR PBB SHADOW E&M-EST. PATIENT-LVL IV: CPT | Mod: PBBFAC,,, | Performed by: PODIATRIST

## 2024-03-05 PROCEDURE — 3078F DIAST BP <80 MM HG: CPT | Mod: CPTII,S$GLB,, | Performed by: PODIATRIST

## 2024-03-05 PROCEDURE — 1111F DSCHRG MED/CURRENT MED MERGE: CPT | Mod: CPTII,S$GLB,, | Performed by: PODIATRIST

## 2024-03-05 PROCEDURE — 99024 POSTOP FOLLOW-UP VISIT: CPT | Mod: S$GLB,,, | Performed by: PODIATRIST

## 2024-03-05 PROCEDURE — 3008F BODY MASS INDEX DOCD: CPT | Mod: CPTII,S$GLB,, | Performed by: PODIATRIST

## 2024-03-05 PROCEDURE — 99214 OFFICE O/P EST MOD 30 MIN: CPT | Mod: 25,S$GLB,, | Performed by: PODIATRIST

## 2024-03-05 PROCEDURE — 3074F SYST BP LT 130 MM HG: CPT | Mod: CPTII,S$GLB,, | Performed by: PODIATRIST

## 2024-03-05 PROCEDURE — 4010F ACE/ARB THERAPY RXD/TAKEN: CPT | Mod: CPTII,S$GLB,, | Performed by: PODIATRIST

## 2024-03-05 PROCEDURE — 1159F MED LIST DOCD IN RCRD: CPT | Mod: CPTII,S$GLB,, | Performed by: PODIATRIST

## 2024-03-05 RX ORDER — GABAPENTIN 100 MG/1
100 CAPSULE ORAL
Qty: 84 CAPSULE | Refills: 1 | Status: SHIPPED | OUTPATIENT
Start: 2024-03-05 | End: 2024-04-23 | Stop reason: SDUPTHER

## 2024-03-05 RX ORDER — METOPROLOL SUCCINATE 100 MG/1
1 TABLET, EXTENDED RELEASE ORAL
COMMUNITY
Start: 2023-05-30 | End: 2024-04-23 | Stop reason: ALTCHOICE

## 2024-03-05 RX ORDER — SUCROFERRIC OXYHYDROXIDE 500 MG/1
TABLET, CHEWABLE ORAL
COMMUNITY
Start: 2024-02-17 | End: 2024-04-23 | Stop reason: SDUPTHER

## 2024-03-05 RX ORDER — TOBRAMYCIN 3 MG/ML
1 SOLUTION/ DROPS OPHTHALMIC
Status: ON HOLD | COMMUNITY
Start: 2023-06-01 | End: 2024-04-18 | Stop reason: HOSPADM

## 2024-03-05 RX ORDER — LINEZOLID 600 MG/1
1 TABLET, FILM COATED ORAL
Status: ON HOLD | COMMUNITY
Start: 2023-03-16 | End: 2024-04-18 | Stop reason: HOSPADM

## 2024-03-05 NOTE — LETTER
March 5, 2024      [facility]  [address]  [place] [address]  Phone: [phone]  Fax: [phone]       Patient: Brenda Huerta   YOB: 1985  Date of Visit: 03/05/2024    To Whom It May Concern:    Esmer Huerta  was at [facility] on 03/05/2024. The patient may return to work/school in 6/8 weeks with restrictions, must wear a surgical shoe. If you have any questions or concerns, or if I can be of further assistance, please do not hesitate to contact me.    Sincerely,    Colleen Garcia DPM

## 2024-03-10 PROBLEM — N18.9 HISTORY OF ANEMIA DUE TO CKD: Status: RESOLVED | Noted: 2024-02-27 | Resolved: 2024-03-10

## 2024-03-10 PROBLEM — Z86.2 HISTORY OF ANEMIA DUE TO CKD: Status: RESOLVED | Noted: 2024-02-27 | Resolved: 2024-03-10

## 2024-03-11 ENCOUNTER — OFFICE VISIT (OUTPATIENT)
Dept: PODIATRY | Facility: CLINIC | Age: 39
End: 2024-03-11
Payer: MEDICARE

## 2024-03-11 VITALS
BODY MASS INDEX: 33.88 KG/M2 | WEIGHT: 250.13 LBS | HEIGHT: 72 IN | HEART RATE: 101 BPM | SYSTOLIC BLOOD PRESSURE: 151 MMHG | DIASTOLIC BLOOD PRESSURE: 80 MMHG

## 2024-03-11 DIAGNOSIS — E11.42 DM TYPE 2 WITH DIABETIC PERIPHERAL NEUROPATHY: ICD-10-CM

## 2024-03-11 DIAGNOSIS — L97.422 HEEL ULCER, LEFT, WITH FAT LAYER EXPOSED: ICD-10-CM

## 2024-03-11 DIAGNOSIS — I97.89 NECROSIS OF SURGICAL WOUND: ICD-10-CM

## 2024-03-11 DIAGNOSIS — Z09 POSTOPERATIVE FOLLOW-UP: Primary | ICD-10-CM

## 2024-03-11 DIAGNOSIS — I96 NECROSIS OF SURGICAL WOUND: ICD-10-CM

## 2024-03-11 DIAGNOSIS — L02.612 ABSCESS OF LEFT HEEL: ICD-10-CM

## 2024-03-11 PROCEDURE — 99024 POSTOP FOLLOW-UP VISIT: CPT | Mod: S$GLB,,, | Performed by: PODIATRIST

## 2024-03-11 PROCEDURE — 3077F SYST BP >= 140 MM HG: CPT | Mod: CPTII,S$GLB,, | Performed by: PODIATRIST

## 2024-03-11 PROCEDURE — 1159F MED LIST DOCD IN RCRD: CPT | Mod: CPTII,S$GLB,, | Performed by: PODIATRIST

## 2024-03-11 PROCEDURE — 4010F ACE/ARB THERAPY RXD/TAKEN: CPT | Mod: CPTII,S$GLB,, | Performed by: PODIATRIST

## 2024-03-11 PROCEDURE — 99999 PR PBB SHADOW E&M-EST. PATIENT-LVL IV: CPT | Mod: PBBFAC,,, | Performed by: PODIATRIST

## 2024-03-11 PROCEDURE — 3079F DIAST BP 80-89 MM HG: CPT | Mod: CPTII,S$GLB,, | Performed by: PODIATRIST

## 2024-03-20 ENCOUNTER — OFFICE VISIT (OUTPATIENT)
Dept: PODIATRY | Facility: CLINIC | Age: 39
End: 2024-03-20
Payer: COMMERCIAL

## 2024-03-20 VITALS
HEART RATE: 88 BPM | BODY MASS INDEX: 34.7 KG/M2 | DIASTOLIC BLOOD PRESSURE: 89 MMHG | SYSTOLIC BLOOD PRESSURE: 200 MMHG | HEIGHT: 72 IN | WEIGHT: 256.19 LBS

## 2024-03-20 DIAGNOSIS — I73.9 PAD (PERIPHERAL ARTERY DISEASE): Chronic | ICD-10-CM

## 2024-03-20 DIAGNOSIS — I97.89 NECROSIS OF SURGICAL WOUND: ICD-10-CM

## 2024-03-20 DIAGNOSIS — M86.8X7 OTHER OSTEOMYELITIS OF LEFT FOOT: ICD-10-CM

## 2024-03-20 DIAGNOSIS — L97.422 ULCER OF LEFT HEEL, WITH FAT LAYER EXPOSED: ICD-10-CM

## 2024-03-20 DIAGNOSIS — E11.22 DIABETES MELLITUS WITH ESRD (END-STAGE RENAL DISEASE): ICD-10-CM

## 2024-03-20 DIAGNOSIS — S91.302D OPEN WOUND OF LEFT HEEL, SUBSEQUENT ENCOUNTER: Primary | ICD-10-CM

## 2024-03-20 DIAGNOSIS — Z89.431 S/P TRANSMETATARSAL AMPUTATION OF FOOT, RIGHT: ICD-10-CM

## 2024-03-20 DIAGNOSIS — L97.412 CHRONIC ULCER OF PLANTAR SURFACE OF MIDFOOT, RIGHT, WITH FAT LAYER EXPOSED: ICD-10-CM

## 2024-03-20 DIAGNOSIS — N18.6 DIABETES MELLITUS WITH ESRD (END-STAGE RENAL DISEASE): ICD-10-CM

## 2024-03-20 DIAGNOSIS — I96 NECROSIS OF SURGICAL WOUND: ICD-10-CM

## 2024-03-20 DIAGNOSIS — L02.612 ABSCESS OF LEFT HEEL: ICD-10-CM

## 2024-03-20 PROCEDURE — 11042 DBRDMT SUBQ TIS 1ST 20SQCM/<: CPT | Mod: S$GLB,,, | Performed by: PODIATRIST

## 2024-03-20 PROCEDURE — 99214 OFFICE O/P EST MOD 30 MIN: CPT | Mod: 25,S$GLB,, | Performed by: PODIATRIST

## 2024-03-20 PROCEDURE — 99999 PR PBB SHADOW E&M-EST. PATIENT-LVL IV: CPT | Mod: PBBFAC,,, | Performed by: PODIATRIST

## 2024-03-20 PROCEDURE — 1159F MED LIST DOCD IN RCRD: CPT | Mod: CPTII,S$GLB,, | Performed by: PODIATRIST

## 2024-03-20 PROCEDURE — 3008F BODY MASS INDEX DOCD: CPT | Mod: CPTII,S$GLB,, | Performed by: PODIATRIST

## 2024-03-20 PROCEDURE — 1111F DSCHRG MED/CURRENT MED MERGE: CPT | Mod: CPTII,S$GLB,, | Performed by: PODIATRIST

## 2024-03-20 PROCEDURE — 4010F ACE/ARB THERAPY RXD/TAKEN: CPT | Mod: CPTII,S$GLB,, | Performed by: PODIATRIST

## 2024-03-20 PROCEDURE — 3079F DIAST BP 80-89 MM HG: CPT | Mod: CPTII,S$GLB,, | Performed by: PODIATRIST

## 2024-03-20 PROCEDURE — 3077F SYST BP >= 140 MM HG: CPT | Mod: CPTII,S$GLB,, | Performed by: PODIATRIST
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
## 2024-03-27 NOTE — PROCEDURES
Wound Debridement L lateral heel    Date/Time: 3/20/2024 10:45 AM    Performed by: Colleen Garcia DPM  Authorized by: Colleen Garcia DPM    Consent Done?:  Yes (Verbal)    Wound Details:    Location:  Left foot    Location:  Left Heel    Type of Debridement:  Excisional       Length (cm):  7.5       Area (sq cm):  15.75       Width (cm):  2.1       Percent Debrided (%):  100       Depth (cm):  0.3       Total Area Debrided (sq cm):  15.75    Depth of debridement:  Subcutaneous tissue    Tissue debrided:  Epidermis, Dermis and Subcutaneous    Devitalized tissue debrided:  Necrotic/Eschar, Fibrin and Slough    Instruments:  Blade, Scissors and Forceps  Bleeding:  Minimal  Hemostasis Achieved: Yes  Method Used:  Pressure  Patient tolerance:  Patient tolerated the procedure well with no immediate complications

## 2024-04-12 ENCOUNTER — TELEPHONE (OUTPATIENT)
Dept: PODIATRY | Facility: CLINIC | Age: 39
End: 2024-04-12
Payer: COMMERCIAL

## 2024-04-12 NOTE — TELEPHONE ENCOUNTER
Left message returning his call. ----- Message from Layla Avalos LPN sent at 4/12/2024  1:41 PM CDT -----    ----- Message -----  From: Cassidy Rogers  Sent: 4/12/2024   1:17 PM CDT  To: [facility] Staff    Name of Who is Calling: MARI CALIX [[id number]]              What is the request in detail: Patient requesting a call back to discuss scheduling appt. No dates available to schedule              Can the clinic reply by [facility]: No              What Number to Call Back if not in [facility]:  [phone]

## 2024-04-17 PROBLEM — Z89.431 HISTORY OF TRANSMETATARSAL AMPUTATION OF RIGHT FOOT: Status: ACTIVE | Noted: 2024-04-17

## 2024-04-17 PROBLEM — N25.81 SECONDARY HYPERPARATHYROIDISM: Status: ACTIVE | Noted: 2024-04-17

## 2024-04-17 PROBLEM — J96.01 ACUTE HYPOXEMIC RESPIRATORY FAILURE: Status: ACTIVE | Noted: 2024-04-17

## 2024-04-17 PROBLEM — L97.512 CHRONIC ULCER OF RIGHT FOOT WITH FAT LAYER EXPOSED: Status: ACTIVE | Noted: 2020-08-26

## 2024-04-17 PROBLEM — N18.6 DIABETES MELLITUS WITH ESRD (END-STAGE RENAL DISEASE): Status: ACTIVE | Noted: 2018-03-07

## 2024-04-17 PROBLEM — L97.422 HEEL ULCER, LEFT, WITH FAT LAYER EXPOSED: Status: ACTIVE | Noted: 2024-04-17

## 2024-04-18 PROBLEM — I10 MALIGNANT HYPERTENSION: Status: RESOLVED | Noted: 2023-10-18 | Resolved: 2024-04-18

## 2024-04-18 PROBLEM — G93.40 ENCEPHALOPATHY: Status: RESOLVED | Noted: 2023-04-23 | Resolved: 2024-04-18

## 2024-04-18 PROBLEM — J96.01 ACUTE HYPOXEMIC RESPIRATORY FAILURE: Status: RESOLVED | Noted: 2024-04-17 | Resolved: 2024-04-18

## 2024-05-08 PROBLEM — A41.9 SEPSIS WITHOUT ACUTE ORGAN DYSFUNCTION: Status: ACTIVE | Noted: 2024-05-08

## 2024-05-13 PROBLEM — A41.9 SEPSIS WITHOUT ACUTE ORGAN DYSFUNCTION: Status: RESOLVED | Noted: 2024-05-08 | Resolved: 2024-05-13

## 2024-05-13 PROBLEM — Z91.199 NONADHERENCE TO MEDICAL TREATMENT: Chronic | Status: RESOLVED | Noted: 2020-11-24 | Resolved: 2024-05-13

## 2024-05-14 PROBLEM — R41.82 ALTERED MENTAL STATE: Status: ACTIVE | Noted: 2024-05-14

## 2024-05-14 PROBLEM — G93.41 ENCEPHALOPATHY, METABOLIC: Status: ACTIVE | Noted: 2023-10-18

## 2024-05-14 PROBLEM — Z86.2 HISTORY OF ANEMIA DUE TO CKD: Status: ACTIVE | Noted: 2024-05-14

## 2024-05-14 PROBLEM — N18.9 HISTORY OF ANEMIA DUE TO CKD: Status: ACTIVE | Noted: 2024-05-14

## 2024-05-15 PROBLEM — I16.0 HYPERTENSIVE URGENCY: Status: ACTIVE | Noted: 2024-05-15

## 2024-05-15 PROBLEM — I50.32 CHRONIC HEART FAILURE WITH PRESERVED EJECTION FRACTION: Status: ACTIVE | Noted: 2024-05-15

## 2024-05-17 PROBLEM — I16.0 HYPERTENSIVE URGENCY: Status: RESOLVED | Noted: 2024-05-15 | Resolved: 2024-05-17

## 2024-05-22 PROBLEM — K52.9 ENTERITIS: Status: RESOLVED | Noted: 2023-03-04 | Resolved: 2024-05-22

## 2024-05-22 PROBLEM — I10 PRIMARY HYPERTENSION: Status: RESOLVED | Noted: 2018-03-08 | Resolved: 2024-05-22

## 2024-05-22 PROBLEM — G93.41 ENCEPHALOPATHY, METABOLIC: Status: RESOLVED | Noted: 2023-10-18 | Resolved: 2024-05-22

## 2024-05-22 PROBLEM — R41.82 ALTERED MENTAL STATE: Status: RESOLVED | Noted: 2024-05-14 | Resolved: 2024-05-22

## 2024-05-22 PROBLEM — I15.0 RENOVASCULAR HYPERTENSION: Chronic | Status: ACTIVE | Noted: 2024-05-22

## 2024-05-22 PROBLEM — M54.50 ACUTE MIDLINE LOW BACK PAIN WITHOUT SCIATICA: Status: RESOLVED | Noted: 2023-08-24 | Resolved: 2024-05-22

## 2024-05-22 PROBLEM — R44.1 HALLUCINATION, VISUAL: Chronic | Status: ACTIVE | Noted: 2024-05-22

## 2024-05-22 PROBLEM — R13.10 DYSPHAGIA: Status: RESOLVED | Noted: 2023-05-08 | Resolved: 2024-05-22

## 2024-05-22 PROBLEM — Z95.828 S/P ARTERIOVENOUS (AV) GRAFT PLACEMENT: Status: RESOLVED | Noted: 2020-01-23 | Resolved: 2024-05-22

## 2024-06-12 ENCOUNTER — PATIENT MESSAGE (OUTPATIENT)
Dept: INTERNAL MEDICINE | Facility: CLINIC | Age: 39
End: 2024-06-12
Payer: COMMERCIAL

## 2024-06-27 PROBLEM — I16.1 HYPERTENSIVE EMERGENCY: Status: ACTIVE | Noted: 2023-10-18

## 2024-06-27 PROBLEM — R41.0 DISORIENTATION: Status: ACTIVE | Noted: 2024-06-27

## 2024-06-28 PROBLEM — I82.412 ACUTE DEEP VEIN THROMBOSIS (DVT) OF FEMORAL VEIN OF LEFT LOWER EXTREMITY: Status: ACTIVE | Noted: 2024-06-28

## 2024-07-01 ENCOUNTER — PATIENT MESSAGE (OUTPATIENT)
Dept: INTERNAL MEDICINE | Facility: CLINIC | Age: 39
End: 2024-07-01
Payer: COMMERCIAL

## 2024-07-01 PROBLEM — I16.1 HYPERTENSIVE EMERGENCY: Status: RESOLVED | Noted: 2023-10-18 | Resolved: 2024-07-01

## 2024-07-07 ENCOUNTER — PATIENT MESSAGE (OUTPATIENT)
Dept: INTERNAL MEDICINE | Facility: CLINIC | Age: 39
End: 2024-07-07
Payer: COMMERCIAL

## 2024-09-25 PROBLEM — R44.1 HALLUCINATION, VISUAL: Chronic | Status: RESOLVED | Noted: 2024-05-22 | Resolved: 2024-09-25

## 2024-09-25 PROBLEM — M79.9 SOFT TISSUE DISORDER: Chronic | Status: ACTIVE | Noted: 2024-09-25

## 2024-09-25 PROBLEM — A49.1 STREPTOCOCCUS INFECTION, GROUP B: Status: RESOLVED | Noted: 2018-03-11 | Resolved: 2024-09-25

## 2024-09-25 PROBLEM — R41.0 DISORIENTATION: Status: RESOLVED | Noted: 2024-06-27 | Resolved: 2024-09-25

## 2024-09-25 PROBLEM — I12.9 HYPERTENSIVE NEPHROPATHY: Status: RESOLVED | Noted: 2023-04-25 | Resolved: 2024-09-25

## 2024-09-25 PROBLEM — Z91.158 NONCOMPLIANCE WITH RENAL DIALYSIS: Status: RESOLVED | Noted: 2020-03-10 | Resolved: 2024-09-25

## 2024-09-30 ENCOUNTER — PATIENT MESSAGE (OUTPATIENT)
Dept: INTERNAL MEDICINE | Facility: CLINIC | Age: 39
End: 2024-09-30
Payer: COMMERCIAL
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
## 2024-10-03 ENCOUNTER — PATIENT MESSAGE (OUTPATIENT)
Dept: INTERNAL MEDICINE | Facility: CLINIC | Age: 39
End: 2024-10-03
Payer: COMMERCIAL

## 2024-10-08 ENCOUNTER — PATIENT MESSAGE (OUTPATIENT)
Dept: INTERNAL MEDICINE | Facility: CLINIC | Age: 39
End: 2024-10-08
Payer: COMMERCIAL

## 2024-10-16 ENCOUNTER — OFFICE VISIT (OUTPATIENT)
Dept: PODIATRY | Facility: CLINIC | Age: 39
End: 2024-10-16
Payer: COMMERCIAL

## 2024-10-16 VITALS
WEIGHT: 240.44 LBS | HEIGHT: 72 IN | HEART RATE: 80 BPM | DIASTOLIC BLOOD PRESSURE: 90 MMHG | BODY MASS INDEX: 32.57 KG/M2 | SYSTOLIC BLOOD PRESSURE: 173 MMHG

## 2024-10-16 DIAGNOSIS — Z79.01 LONG TERM CURRENT USE OF ANTICOAGULANT: ICD-10-CM

## 2024-10-16 DIAGNOSIS — M79.89 SWELLING OF TOE OF LEFT FOOT: ICD-10-CM

## 2024-10-16 DIAGNOSIS — Z89.431 S/P TRANSMETATARSAL AMPUTATION OF FOOT, RIGHT: ICD-10-CM

## 2024-10-16 DIAGNOSIS — S91.105A OPEN WOUND OF FIFTH TOE, LEFT, INITIAL ENCOUNTER: Primary | ICD-10-CM

## 2024-10-16 DIAGNOSIS — E11.42 DM TYPE 2 WITH DIABETIC PERIPHERAL NEUROPATHY: ICD-10-CM

## 2024-10-16 DIAGNOSIS — N18.6 DIABETES MELLITUS WITH ESRD (END-STAGE RENAL DISEASE): ICD-10-CM

## 2024-10-16 DIAGNOSIS — L97.422 ULCER OF HEEL AND MIDFOOT, LEFT, WITH FAT LAYER EXPOSED: ICD-10-CM

## 2024-10-16 DIAGNOSIS — M86.9 OSTEOMYELITIS OF FIFTH TOE OF LEFT FOOT: ICD-10-CM

## 2024-10-16 DIAGNOSIS — E11.22 DIABETES MELLITUS WITH ESRD (END-STAGE RENAL DISEASE): ICD-10-CM

## 2024-10-16 PROCEDURE — 4010F ACE/ARB THERAPY RXD/TAKEN: CPT | Mod: CPTII,S$GLB,, | Performed by: PODIATRIST

## 2024-10-16 PROCEDURE — 99214 OFFICE O/P EST MOD 30 MIN: CPT | Mod: S$GLB,,, | Performed by: PODIATRIST

## 2024-10-16 PROCEDURE — 99999 PR PBB SHADOW E&M-EST. PATIENT-LVL V: CPT | Mod: PBBFAC,,, | Performed by: PODIATRIST

## 2024-10-16 PROCEDURE — 3080F DIAST BP >= 90 MM HG: CPT | Mod: CPTII,S$GLB,, | Performed by: PODIATRIST

## 2024-10-16 PROCEDURE — 3077F SYST BP >= 140 MM HG: CPT | Mod: CPTII,S$GLB,, | Performed by: PODIATRIST

## 2024-10-16 PROCEDURE — 3044F HG A1C LEVEL LT 7.0%: CPT | Mod: CPTII,S$GLB,, | Performed by: PODIATRIST

## 2024-10-16 PROCEDURE — 1159F MED LIST DOCD IN RCRD: CPT | Mod: CPTII,S$GLB,, | Performed by: PODIATRIST

## 2024-10-16 PROCEDURE — 3008F BODY MASS INDEX DOCD: CPT | Mod: CPTII,S$GLB,, | Performed by: PODIATRIST

## 2024-10-16 RX ORDER — CIPROFLOXACIN 2 MG/ML
400 INJECTION, SOLUTION INTRAVENOUS ONCE
Status: CANCELLED | OUTPATIENT
Start: 2024-10-18

## 2024-10-16 RX ORDER — CARVEDILOL 25 MG/1
25 TABLET ORAL 2 TIMES DAILY
COMMUNITY
Start: 2024-08-26

## 2024-10-16 RX ORDER — SODIUM CHLORIDE 0.9 % (FLUSH) 0.9 %
10 SYRINGE (ML) INJECTION
Status: CANCELLED | OUTPATIENT
Start: 2024-10-18
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
## 2024-10-21 ENCOUNTER — TELEPHONE (OUTPATIENT)
Dept: PODIATRY | Facility: CLINIC | Age: 39
End: 2024-10-21
Payer: COMMERCIAL

## 2024-10-21 DIAGNOSIS — M86.9 OSTEOMYELITIS OF FIFTH TOE OF LEFT FOOT: Primary | ICD-10-CM

## 2024-10-21 RX ORDER — CIPROFLOXACIN 750 MG/1
750 TABLET, FILM COATED ORAL EVERY 12 HOURS
Qty: 56 TABLET | Refills: 0 | Status: SHIPPED | OUTPATIENT
Start: 2024-10-21 | End: 2024-11-18

## 2024-10-21 NOTE — TELEPHONE ENCOUNTER
Left message that Dr. Garcia sent in a new antibiotic(Cipro) to [facility] on [address]. Any questions please call office.----- Message from Colleen Garcia DPM sent at 10/21/2024 10:37 AM CDT -----  Sending in new antibiotics based on cultures.  ----- Message -----  From: Manolo, [facility] Interface  Sent: 10/20/2024   7:53 AM CDT  To: Colleen Garcia DPM

## 2024-10-27 ENCOUNTER — PATIENT MESSAGE (OUTPATIENT)
Dept: INTERNAL MEDICINE | Facility: CLINIC | Age: 39
End: 2024-10-27
Payer: COMMERCIAL

## 2024-10-30 ENCOUNTER — OFFICE VISIT (OUTPATIENT)
Dept: PODIATRY | Facility: CLINIC | Age: 39
End: 2024-10-30
Payer: COMMERCIAL

## 2024-10-30 VITALS
BODY MASS INDEX: 30.92 KG/M2 | HEART RATE: 81 BPM | HEIGHT: 72 IN | WEIGHT: 228.31 LBS | SYSTOLIC BLOOD PRESSURE: 133 MMHG | DIASTOLIC BLOOD PRESSURE: 72 MMHG

## 2024-10-30 DIAGNOSIS — Z79.01 LONG TERM CURRENT USE OF ANTICOAGULANT: ICD-10-CM

## 2024-10-30 DIAGNOSIS — Z89.431 HISTORY OF TRANSMETATARSAL AMPUTATION OF RIGHT FOOT: ICD-10-CM

## 2024-10-30 DIAGNOSIS — L97.422 CHRONIC ULCER OF HEEL, LEFT, WITH FAT LAYER EXPOSED: Primary | ICD-10-CM

## 2024-10-30 DIAGNOSIS — Z09 POSTOPERATIVE FOLLOW-UP: ICD-10-CM

## 2024-10-30 DIAGNOSIS — I73.9 PAD (PERIPHERAL ARTERY DISEASE): Chronic | ICD-10-CM

## 2024-10-30 DIAGNOSIS — I82.412 ACUTE DEEP VEIN THROMBOSIS (DVT) OF FEMORAL VEIN OF LEFT LOWER EXTREMITY: ICD-10-CM

## 2024-10-30 DIAGNOSIS — M86.9 OSTEOMYELITIS OF FIFTH TOE OF LEFT FOOT: ICD-10-CM

## 2024-10-30 DIAGNOSIS — S91.105D OPEN WOUND OF FIFTH TOE OF LEFT FOOT, SUBSEQUENT ENCOUNTER: ICD-10-CM

## 2024-10-30 PROCEDURE — 11042 DBRDMT SUBQ TIS 1ST 20SQCM/<: CPT | Mod: S$GLB,,, | Performed by: PODIATRIST

## 2024-10-30 PROCEDURE — 3044F HG A1C LEVEL LT 7.0%: CPT | Mod: CPTII,S$GLB,, | Performed by: PODIATRIST

## 2024-10-30 PROCEDURE — 3078F DIAST BP <80 MM HG: CPT | Mod: CPTII,S$GLB,, | Performed by: PODIATRIST

## 2024-10-30 PROCEDURE — 3008F BODY MASS INDEX DOCD: CPT | Mod: CPTII,S$GLB,, | Performed by: PODIATRIST

## 2024-10-30 PROCEDURE — 1159F MED LIST DOCD IN RCRD: CPT | Mod: CPTII,S$GLB,, | Performed by: PODIATRIST

## 2024-10-30 PROCEDURE — 4010F ACE/ARB THERAPY RXD/TAKEN: CPT | Mod: CPTII,S$GLB,, | Performed by: PODIATRIST

## 2024-10-30 PROCEDURE — 99024 POSTOP FOLLOW-UP VISIT: CPT | Mod: S$GLB,,, | Performed by: PODIATRIST

## 2024-10-30 PROCEDURE — 99999 PR PBB SHADOW E&M-EST. PATIENT-LVL IV: CPT | Mod: PBBFAC,,, | Performed by: PODIATRIST

## 2024-10-30 PROCEDURE — 99213 OFFICE O/P EST LOW 20 MIN: CPT | Mod: 25,S$GLB,, | Performed by: PODIATRIST

## 2024-10-30 PROCEDURE — 3075F SYST BP GE 130 - 139MM HG: CPT | Mod: CPTII,S$GLB,, | Performed by: PODIATRIST

## 2024-11-11 NOTE — PROGRESS NOTES
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      3/5/24          Problem List Items Addressed This Visit          Cardiac/Vascular    PAD (peripheral artery disease) (Chronic)       Endocrine    Diabetes mellitus with ESRD (end-stage renal disease)    Overview     Pt. Has creat above baseline with decreased GFR prob. Due to volume depletion or ATN due to infection in RLE            Orthopedic    History of transmetatarsal amputation of right foot     Other Visit Diagnoses       Visit for wound check    -  Primary    Long term current use of anticoagulant        DM type 2 with diabetic peripheral neuropathy        Postoperative follow-up        Osteomyelitis of fifth toe of left foot        Chronic ulcer of heel, left, with fat layer exposed        Relevant Orders    Wound Debridement L heel    Granulation tissue        Relevant Orders    Wound Debridement L heel        L heel cleaned w/ Chloraprep & debrided.  Wet-dry Betadine dressing applied. May change dressing L heel q.o.d. w/ Betadine wet-to-dry. To otherwise be kept CDI.    L 5th toe suture removal.  May get foot wet in a shower in 3 days (however, has to keep the rest of his foot dry so may be wise to keep the entire toe dry until the heel is fully resolved).    Continue offloading w/ surgical shoe all times WB including @ home, w/ cut-out ulcer  pad.    F/u 2-3 wks. for wound check.        A total of 30 mins.was spent on chart review, patient visit & documentation.

## 2024-11-18 ENCOUNTER — OFFICE VISIT (OUTPATIENT)
Dept: PODIATRY | Facility: CLINIC | Age: 39
End: 2024-11-18
Payer: COMMERCIAL

## 2024-11-18 VITALS
HEIGHT: 72 IN | HEART RATE: 78 BPM | SYSTOLIC BLOOD PRESSURE: 193 MMHG | BODY MASS INDEX: 32.25 KG/M2 | DIASTOLIC BLOOD PRESSURE: 83 MMHG | WEIGHT: 238.13 LBS

## 2024-11-18 DIAGNOSIS — Z09 POSTOPERATIVE FOLLOW-UP: ICD-10-CM

## 2024-11-18 DIAGNOSIS — E11.42 DM TYPE 2 WITH DIABETIC PERIPHERAL NEUROPATHY: ICD-10-CM

## 2024-11-18 DIAGNOSIS — M86.9 OSTEOMYELITIS OF FIFTH TOE OF LEFT FOOT: ICD-10-CM

## 2024-11-18 DIAGNOSIS — Z51.89 VISIT FOR WOUND CHECK: Primary | ICD-10-CM

## 2024-11-18 DIAGNOSIS — I73.9 PAD (PERIPHERAL ARTERY DISEASE): Chronic | ICD-10-CM

## 2024-11-18 DIAGNOSIS — Z79.01 LONG TERM CURRENT USE OF ANTICOAGULANT: ICD-10-CM

## 2024-11-18 DIAGNOSIS — L92.9 GRANULATION TISSUE: ICD-10-CM

## 2024-11-18 DIAGNOSIS — N18.6 DIABETES MELLITUS WITH ESRD (END-STAGE RENAL DISEASE): ICD-10-CM

## 2024-11-18 DIAGNOSIS — L97.422 CHRONIC ULCER OF HEEL, LEFT, WITH FAT LAYER EXPOSED: ICD-10-CM

## 2024-11-18 DIAGNOSIS — E11.22 DIABETES MELLITUS WITH ESRD (END-STAGE RENAL DISEASE): ICD-10-CM

## 2024-11-18 DIAGNOSIS — Z89.431 HISTORY OF TRANSMETATARSAL AMPUTATION OF RIGHT FOOT: ICD-10-CM

## 2024-11-18 PROCEDURE — 99999 PR PBB SHADOW E&M-EST. PATIENT-LVL IV: CPT | Mod: PBBFAC,,, | Performed by: PODIATRIST

## 2024-11-18 RX ORDER — HYDROXYZINE HYDROCHLORIDE 25 MG/1
TABLET, FILM COATED ORAL
COMMUNITY
Start: 2024-05-24

## 2024-11-18 RX ORDER — ACETAMINOPHEN 500 MG
TABLET ORAL
COMMUNITY

## 2024-11-21 ENCOUNTER — PATIENT MESSAGE (OUTPATIENT)
Dept: INTERNAL MEDICINE | Facility: CLINIC | Age: 39
End: 2024-11-21
Payer: COMMERCIAL

## 2024-11-25 NOTE — PROGRESS NOTES
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 PIPJ/ middle phalanx consistent w/ area of wound, suspicious for osteomyelitis.  No acute changes in the area of the plantar heel w/ chronic wound.    3/11/24      3/5/24          Problem List Items Addressed This Visit          Endocrine    Diabetes mellitus with ESRD (end-stage renal disease)    Overview     Pt. Has creat above baseline with decreased GFR prob. Due to volume depletion or ATN due to infection in RLE            Orthopedic    Heel ulcer, left, with fat layer exposed     Other Visit Diagnoses       Visit for wound check    -  Primary    DM type 2 with diabetic peripheral neuropathy            L heel cleaned w/ Chloraprep & debrided.  Wet-dry Betadine dressing applied. May change dressing L heel q.o.d. w/ Betadine wet-to-dry. To otherwise be kept CDI.    Continue offloading w/ surgical shoe all times WB including @ home, w/ cut-out ulcer pad.    F/u 2-3 wks. for wound check.        A total of 26 mins.was spent on chart review, patient visit & documentation.

## 2024-11-25 NOTE — PROCEDURES
Wound Debridement L heel    Date/Time: 11/18/2024 1:45 PM    Performed by: Colleen Garcia DPM  Authorized by: Colleen Garcia DPM    Consent Done?:  Yes (Verbal)    Wound Details:    Location:  Left foot    Location:  Left Heel    Type of Debridement:  Excisional       Length (cm):  2.1       Width (cm):  2.3       Depth (cm):  0.2       Area (sq cm):  4.83       Percent Debrided (%):  100       Total Area Debrided (sq cm):  4.83    Depth of debridement:  Epidermis/Dermis    Tissue debrided:  Epidermis, Dermis and Hypergranulation    Devitalized tissue debrided:  Callus and Slough    Instruments:  Nippers, Blade and Other  Bleeding:  Minimal  Hemostasis Achieved: Yes  Method Used:  Silver Nitrate and Pressure  Patient tolerance:  Patient tolerated the procedure well with no immediate complications     After sharp debridement heel ulcer, chemical cauterization of hypergranulation tissue performed w/ silver nitrate until entire area desiccated & level w/ the adjacent surrounding skin.

## 2024-12-07 ENCOUNTER — PATIENT MESSAGE (OUTPATIENT)
Dept: INTERNAL MEDICINE | Facility: CLINIC | Age: 39
End: 2024-12-07
Payer: COMMERCIAL

## 2024-12-10 ENCOUNTER — PATIENT MESSAGE (OUTPATIENT)
Dept: INTERNAL MEDICINE | Facility: CLINIC | Age: 39
End: 2024-12-10
Payer: COMMERCIAL

## 2024-12-10 ENCOUNTER — OFFICE VISIT (OUTPATIENT)
Dept: PODIATRY | Facility: CLINIC | Age: 39
End: 2024-12-10
Payer: COMMERCIAL

## 2024-12-10 VITALS
SYSTOLIC BLOOD PRESSURE: 142 MMHG | WEIGHT: 233.69 LBS | HEIGHT: 72 IN | DIASTOLIC BLOOD PRESSURE: 78 MMHG | HEART RATE: 85 BPM | BODY MASS INDEX: 31.65 KG/M2

## 2024-12-10 DIAGNOSIS — L97.422 HEEL ULCER, LEFT, WITH FAT LAYER EXPOSED: ICD-10-CM

## 2024-12-10 DIAGNOSIS — Z51.89 VISIT FOR WOUND CHECK: Primary | ICD-10-CM

## 2024-12-10 DIAGNOSIS — N18.6 DIABETES MELLITUS WITH ESRD (END-STAGE RENAL DISEASE): ICD-10-CM

## 2024-12-10 DIAGNOSIS — E11.42 DM TYPE 2 WITH DIABETIC PERIPHERAL NEUROPATHY: ICD-10-CM

## 2024-12-10 DIAGNOSIS — E11.22 DIABETES MELLITUS WITH ESRD (END-STAGE RENAL DISEASE): ICD-10-CM

## 2024-12-10 PROCEDURE — 3044F HG A1C LEVEL LT 7.0%: CPT | Mod: CPTII,S$GLB,, | Performed by: PODIATRIST

## 2024-12-10 PROCEDURE — 4010F ACE/ARB THERAPY RXD/TAKEN: CPT | Mod: CPTII,S$GLB,, | Performed by: PODIATRIST

## 2024-12-10 PROCEDURE — 11042 DBRDMT SUBQ TIS 1ST 20SQCM/<: CPT | Mod: S$GLB,,, | Performed by: PODIATRIST

## 2024-12-10 PROCEDURE — 3008F BODY MASS INDEX DOCD: CPT | Mod: CPTII,S$GLB,, | Performed by: PODIATRIST

## 2024-12-10 PROCEDURE — 99213 OFFICE O/P EST LOW 20 MIN: CPT | Mod: 25,S$GLB,, | Performed by: PODIATRIST

## 2024-12-10 PROCEDURE — 3077F SYST BP >= 140 MM HG: CPT | Mod: CPTII,S$GLB,, | Performed by: PODIATRIST

## 2024-12-10 PROCEDURE — 1159F MED LIST DOCD IN RCRD: CPT | Mod: CPTII,S$GLB,, | Performed by: PODIATRIST

## 2024-12-10 PROCEDURE — 3078F DIAST BP <80 MM HG: CPT | Mod: CPTII,S$GLB,, | Performed by: PODIATRIST

## 2024-12-10 PROCEDURE — 99999 PR PBB SHADOW E&M-EST. PATIENT-LVL IV: CPT | Mod: PBBFAC,,, | Performed by: PODIATRIST

## 2024-12-13 ENCOUNTER — PATIENT MESSAGE (OUTPATIENT)
Dept: INTERNAL MEDICINE | Facility: CLINIC | Age: 39
End: 2024-12-13
Payer: COMMERCIAL

## 2024-12-16 ENCOUNTER — TELEPHONE (OUTPATIENT)
Dept: PODIATRY | Facility: CLINIC | Age: 39
End: 2024-12-16
Payer: COMMERCIAL

## 2024-12-16 NOTE — PROCEDURES
Wound Debridement L heel    Date/Time: 12/10/2024 1:45 PM    Performed by: Colleen Garcia DPM  Authorized by: Colleen Garcia DPM    Consent Done?:  Yes (Verbal)    Wound Details:    Location:  Left foot    Location:  Left Heel    Type of Debridement:  Excisional       Length (cm):  2.1       Width (cm):  1.9       Depth (cm):  0.2       Area (sq cm):  3.99       Percent Debrided (%):  100       Total Area Debrided (sq cm):  3.99    Depth of debridement:  Subcutaneous tissue    Tissue debrided:  Epidermis and Dermis    Devitalized tissue debrided:  Callus and Slough    Instruments:  Blade and Nippers  Bleeding:  Minimal  Hemostasis Achieved: Yes  Method Used:  Pressure  Patient tolerance:  Patient tolerated the procedure well with no immediate complications

## 2024-12-16 NOTE — TELEPHONE ENCOUNTER
Spoke with patient and scheduled him for 12/18/24 at 9:45 am. Verbalized understanding and no further issues discussed.----- Message from Carlos sent at 12/16/2024  3:03 PM CST -----  Type: General Call Back     Name of Caller:pt   Reason pt needs to reschedule appt made for 12/17  Would the patient rather a call back or a response via [employer] [facility]? Call   Best Call Back Number:[phone]  Additional Information:

## 2024-12-18 ENCOUNTER — OFFICE VISIT (OUTPATIENT)
Dept: PODIATRY | Facility: CLINIC | Age: 39
End: 2024-12-18
Payer: COMMERCIAL

## 2024-12-18 VITALS
DIASTOLIC BLOOD PRESSURE: 67 MMHG | HEART RATE: 96 BPM | BODY MASS INDEX: 31.55 KG/M2 | HEIGHT: 72 IN | WEIGHT: 232.94 LBS | SYSTOLIC BLOOD PRESSURE: 111 MMHG

## 2024-12-18 DIAGNOSIS — N18.6 DIABETES MELLITUS WITH ESRD (END-STAGE RENAL DISEASE): ICD-10-CM

## 2024-12-18 DIAGNOSIS — E11.42 DM TYPE 2 WITH DIABETIC PERIPHERAL NEUROPATHY: ICD-10-CM

## 2024-12-18 DIAGNOSIS — E11.22 DIABETES MELLITUS WITH ESRD (END-STAGE RENAL DISEASE): ICD-10-CM

## 2024-12-18 DIAGNOSIS — Z87.2 HEALED ULCER OF LEFT FOOT: ICD-10-CM

## 2024-12-18 DIAGNOSIS — L97.422 HEEL ULCER, LEFT, WITH FAT LAYER EXPOSED: Primary | ICD-10-CM

## 2024-12-18 PROCEDURE — 99999 PR PBB SHADOW E&M-EST. PATIENT-LVL IV: CPT | Mod: PBBFAC,,, | Performed by: PODIATRIST

## 2024-12-18 NOTE — PROGRESS NOTES
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 which correlation advised.  If osteomyelitis is of concern, MRI recommended for further evaluation.    Electronically signed by: Shyam Reynolds MD  Date:    10/11/2024  Time:    13:39  I independently reviewed the x-ray images.  Focus on L foot as ASX R. Bone changes to 5th PIPJ/ middle phalanx consistent w/ area of wound, suspicious for osteomyelitis.  No acute changes in the area of the plantar heel w/ chronic wound.    3/11/24      3/5/24          Problem List Items Addressed This Visit          Endocrine    Diabetes mellitus with ESRD (end-stage renal disease)    Overview     Pt. Has creat above baseline with decreased GFR prob. Due to volume depletion or ATN due to infection in RLE            Orthopedic    Heel ulcer, left, with fat layer exposed - Primary    Relevant Orders    Wound Debridement plantar L lateral heel     Other Visit Diagnoses       Healed ulcer of left foot        DM type 2 with diabetic peripheral neuropathy            L heel cleaned w/ Chloraprep & debrided.  Wet-dry Betadine dressing applied. May change dressing L heel q.o.d. w/ Betadine wet-to-dry. To otherwise be kept CDI.    F/u 3 wks. for wound check.        A total of 29 so quit for the pumps mins.was spent on chart review, patient visit & documentation.

## 2024-12-19 ENCOUNTER — PATIENT MESSAGE (OUTPATIENT)
Dept: INTERNAL MEDICINE | Facility: CLINIC | Age: 39
End: 2024-12-19
Payer: COMMERCIAL

## 2024-12-20 NOTE — PROCEDURES
Wound Debridement plantar L lateral heel    Date/Time: 12/18/2024 9:45 AM    Performed by: Colleen Garcia DPM  Authorized by: Colleen Garcia DPM    Consent Done?:  Yes (Verbal)    Wound Details:    Location:  Left foot    Location:  Left Heel    Type of Debridement:  Excisional       Length (cm):  2.3       Width (cm):  2.1       Depth (cm):  0.2       Area (sq cm):  4.83       Percent Debrided (%):  100       Total Area Debrided (sq cm):  4.83    Depth of debridement:  Subcutaneous tissue    Tissue debrided:  Epidermis and Dermis    Devitalized tissue debrided:  Fibrin, Slough and Callus    Instruments:  Nippers and Blade  Bleeding:  Minimal  Hemostasis Achieved: Yes  Method Used:  Pressure and Silver Nitrate  Patient tolerance:  Patient tolerated the procedure well with no immediate complications

## 2024-12-21 ENCOUNTER — PATIENT MESSAGE (OUTPATIENT)
Dept: INTERNAL MEDICINE | Facility: CLINIC | Age: 39
End: 2024-12-21
Payer: COMMERCIAL

## 2024-12-26 ENCOUNTER — PATIENT MESSAGE (OUTPATIENT)
Dept: INTERNAL MEDICINE | Facility: CLINIC | Age: 39
End: 2024-12-26
Payer: COMMERCIAL

## 2025-01-01 ENCOUNTER — PATIENT MESSAGE (OUTPATIENT)
Dept: INTERNAL MEDICINE | Facility: CLINIC | Age: 40
End: 2025-01-01
Payer: COMMERCIAL

## 2025-01-06 ENCOUNTER — TELEPHONE (OUTPATIENT)
Dept: PODIATRY | Facility: CLINIC | Age: 40
End: 2025-01-06
Payer: COMMERCIAL

## 2025-01-06 NOTE — TELEPHONE ENCOUNTER
Left message that we need to move his appointment tomorrow from 1:00 pm to 9:15 am due to provider will be in surgery. Please call office if that time does not work ThanksLauren

## 2025-01-07 ENCOUNTER — OFFICE VISIT (OUTPATIENT)
Dept: PODIATRY | Facility: CLINIC | Age: 40
End: 2025-01-07
Payer: COMMERCIAL

## 2025-01-07 ENCOUNTER — TELEPHONE (OUTPATIENT)
Dept: PODIATRY | Facility: CLINIC | Age: 40
End: 2025-01-07
Payer: COMMERCIAL

## 2025-01-07 ENCOUNTER — PATIENT MESSAGE (OUTPATIENT)
Dept: INTERNAL MEDICINE | Facility: CLINIC | Age: 40
End: 2025-01-07
Payer: COMMERCIAL

## 2025-01-07 VITALS
BODY MASS INDEX: 32.44 KG/M2 | HEART RATE: 79 BPM | HEIGHT: 72 IN | WEIGHT: 239.5 LBS | SYSTOLIC BLOOD PRESSURE: 166 MMHG | DIASTOLIC BLOOD PRESSURE: 90 MMHG

## 2025-01-07 DIAGNOSIS — E08.42 DIABETIC POLYNEUROPATHY ASSOCIATED WITH DIABETES MELLITUS DUE TO UNDERLYING CONDITION: ICD-10-CM

## 2025-01-07 DIAGNOSIS — Z51.89 VISIT FOR WOUND CHECK: Primary | ICD-10-CM

## 2025-01-07 DIAGNOSIS — L97.422 CHRONIC ULCER OF HEEL, LEFT, WITH FAT LAYER EXPOSED: ICD-10-CM

## 2025-01-07 PROCEDURE — 99999 PR PBB SHADOW E&M-EST. PATIENT-LVL IV: CPT | Mod: PBBFAC,,, | Performed by: PODIATRIST

## 2025-01-07 PROCEDURE — 99213 OFFICE O/P EST LOW 20 MIN: CPT | Mod: 25,S$GLB,, | Performed by: PODIATRIST

## 2025-01-07 PROCEDURE — 3008F BODY MASS INDEX DOCD: CPT | Mod: CPTII,S$GLB,, | Performed by: PODIATRIST

## 2025-01-07 PROCEDURE — 11042 DBRDMT SUBQ TIS 1ST 20SQCM/<: CPT | Mod: S$GLB,,, | Performed by: PODIATRIST

## 2025-01-07 PROCEDURE — 1159F MED LIST DOCD IN RCRD: CPT | Mod: CPTII,S$GLB,, | Performed by: PODIATRIST

## 2025-01-07 PROCEDURE — 3077F SYST BP >= 140 MM HG: CPT | Mod: CPTII,S$GLB,, | Performed by: PODIATRIST

## 2025-01-07 PROCEDURE — 4010F ACE/ARB THERAPY RXD/TAKEN: CPT | Mod: CPTII,S$GLB,, | Performed by: PODIATRIST

## 2025-01-07 PROCEDURE — 3080F DIAST BP >= 90 MM HG: CPT | Mod: CPTII,S$GLB,, | Performed by: PODIATRIST

## 2025-01-07 RX ORDER — CIPROFLOXACIN 500 MG/1
1 TABLET ORAL 2 TIMES DAILY
COMMUNITY
Start: 2025-01-06 | End: 2025-03-07

## 2025-01-07 NOTE — TELEPHONE ENCOUNTER
Spoke with patient and ok for him to come in for 3:00 pm. Verbalized understanding and no further issues discussed.----- Message from Мария sent at 1/7/2025  7:51 AM CST -----       Nature of Call: requesting a later time for appt today     Best Call Back Number: [phone]     Additional Information:  MARI FIFI calling regarding Patient Advice for stating he was supposed to attend the appt today at 2 pm, but have to  his daughter at 2 pm and is asking can he come in for 3 pm instead. Please call PT to inform if this request is possible

## 2025-01-07 NOTE — PROGRESS NOTES
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 Reynolds MD  Date:    10/11/2024  Time:    13:39  I independently reviewed the x-ray images.  Focus on L foot as ASX R. Bone changes to 5th PIPJ/ middle phalanx consistent w/ area of wound, suspicious for osteomyelitis.  No acute changes in the area of the plantar heel w/ chronic wound.    3/11/24      3/5/24          Problem List Items Addressed This Visit    None  Visit Diagnoses       Visit for wound check    -  Primary    Chronic ulcer of heel, left, with fat layer exposed        Relevant Orders    Wound Debridement    Diabetic polyneuropathy associated with diabetes mellitus due to underlying condition            L heel wound cleaned w/ Chloraprep & sharply debrided.  Cauterization w/ silver nitrate.  Wet-dry Betadine dressing applied. May change dressing L heel q.o.d. w/ Betadine wet-to-dry. To otherwise be kept CDI.    F/u 3 wks. for wound check.        A total of 21 mins.was spent on chart review, patient visit & documentation.

## 2025-01-09 ENCOUNTER — PATIENT MESSAGE (OUTPATIENT)
Dept: INTERNAL MEDICINE | Facility: CLINIC | Age: 40
End: 2025-01-09
Payer: COMMERCIAL

## 2025-01-13 NOTE — PROCEDURES
Wound Debridement    Date/Time: 1/7/2025 9:15 AM    Performed by: Colleen Garcia DPM  Authorized by: Colleen Garcia DPM    Consent Done?:  Yes (Verbal)    Wound Details:    Location:  Left foot    Location:  Left Heel    Type of Debridement:  Excisional       Length (cm):  2.3       Width (cm):  2.1       Depth (cm):  0.2       Area (sq cm):  4.83       Percent Debrided (%):  100       Total Area Debrided (sq cm):  4.83    Depth of debridement:  Subcutaneous tissue    Tissue debrided:  Epidermis, Dermis, Subcutaneous and Hypergranulation    Devitalized tissue debrided:  Callus    Instruments:  Nippers  Bleeding:  Minimal  Hemostasis Achieved: Yes  Method Used:  Silver Nitrate and Pressure  Patient tolerance:  Patient tolerated the procedure well with no immediate complications

## 2025-02-10 ENCOUNTER — OFFICE VISIT (OUTPATIENT)
Dept: PODIATRY | Facility: CLINIC | Age: 40
End: 2025-02-10
Payer: COMMERCIAL

## 2025-02-10 VITALS
SYSTOLIC BLOOD PRESSURE: 148 MMHG | HEART RATE: 85 BPM | WEIGHT: 240.63 LBS | HEIGHT: 72 IN | DIASTOLIC BLOOD PRESSURE: 75 MMHG | BODY MASS INDEX: 32.59 KG/M2

## 2025-02-10 DIAGNOSIS — E11.22 DIABETES MELLITUS WITH ESRD (END-STAGE RENAL DISEASE): Primary | ICD-10-CM

## 2025-02-10 DIAGNOSIS — E08.42 DIABETIC POLYNEUROPATHY ASSOCIATED WITH DIABETES MELLITUS DUE TO UNDERLYING CONDITION: ICD-10-CM

## 2025-02-10 DIAGNOSIS — N18.6 DIABETES MELLITUS WITH ESRD (END-STAGE RENAL DISEASE): Primary | ICD-10-CM

## 2025-02-10 DIAGNOSIS — L97.422 CHRONIC ULCER OF HEEL, LEFT, WITH FAT LAYER EXPOSED: ICD-10-CM

## 2025-02-10 DIAGNOSIS — Z51.89 VISIT FOR WOUND CHECK: ICD-10-CM

## 2025-02-10 NOTE — PROGRESS NOTES
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 open wound, right foot, initial encounter    TECHNIQUE:  AP, lateral, and oblique views of the right foot were performed.    COMPARISON:  None.    FINDINGS:  Transmetatarsal amputation with chronic fusion at the level of the RIGHT 2-3 mid metatarsal level.  Patchy osteopenia. No fracture or dislocation.  Lisfranc articulation is congruent.  Cartilage spaces are maintained.  Diffuse soft tissue swelling.  Osseous calcaneal spur.  Vascular calcifications.  No radiographic evidence for osteomyelitis yet at this is of concern, dedicated MRI could further evaluate.  Findings more consistent with skin and soft tissue infection/inflammation/cellulitis.  Impression: As above.    Electronically signed by: Shyam Reynolds MD  Date:    10/11/2024  Time:    13:41  X-Ray Foot Complete 3 view Left  Narrative: EXAMINATION:  XR FOOT COMPLETE 3 VIEW LEFT    CLINICAL HISTORY:  .  Unspecified open wound, left foot, initial encounter    TECHNIQUE:  AP, lateral and oblique views of the left foot were performed.    COMPARISON:  02/27/2024    FINDINGS:  No fracture or dislocation.  Lisfranc articulation is congruent.  Interval irregularity at the level of the LEFT 5th PIP may be degenerative or post infectious.  Correlation suggested..  Cystic change at the level of the  metatarsal head great toe.  Vascular calcifications.  Degenerative changes LEFT 1st DIP.  Flatfoot deformity.  Erosive changes stable at the level of the plantar aspect of the calcaneus.  Prominence T8 of process.  Talar beaking with degenerative change talonavicular joint.  Impression: As above.  Interval irregularity at the level of the LEFT 5th PIP for which correlation advised.  If osteomyelitis is of concern, MRI recommended for further evaluation.    Electronically signed by: Shyam Reynolds MD  Date:    10/11/2024  Time:    13:39  I independently reviewed the x-ray images.  Focus on L foot as ASX R. Bone changes to 5th PIPJ/ middle phalanx consistent w/ area of wound, suspicious for osteomyelitis.  No acute changes in the area of the plantar heel w/ chronic wound.    3/11/24      3/5/24          Problem List Items Addressed This Visit          Endocrine    Diabetes mellitus with ESRD (end-stage renal disease) - Primary    Overview   Pt. Has creat above baseline with decreased GFR prob. Due to volume depletion or ATN due to infection in RLE          Other Visit Diagnoses         Diabetic polyneuropathy associated with diabetes mellitus due to underlying condition          Visit for wound check          Chronic ulcer of heel, left, with fat layer exposed        Relevant Orders    Wound Debridement L heel        L heel cleaned w/ Chloraprep & debrided.  Wet-dry Betadine dressing applied. May change dressing L heel q.o.d. w/ Betadine wet-to-dry. To otherwise be kept CDI.    F/u 2-3 wks. for wound check.        A total of 33 mins.was spent on chart review, patient visit & documentation.

## 2025-02-17 NOTE — PROCEDURES
Wound Debridement L heel    Date/Time: 2/10/2025 11:30 AM    Performed by: Colleen Garcia DPM  Authorized by: Colleen Garcia DPM    Consent Done?:  Yes (Verbal)    Wound Details:    Location:  Left foot    Location:  Left Heel    Type of Debridement:  Excisional       Length (cm):  6       Width (cm):  6.5       Depth (cm):  0.2       Area (sq cm):  39       Percent Debrided (%):  100       Total Area Debrided (sq cm):  39    Depth of debridement:  Subcutaneous tissue    Tissue debrided:  Epidermis and Cartilage    Devitalized tissue debrided:  Callus and Slough    Instruments:  Blade and Nippers  Bleeding:  Minimal  Hemostasis Achieved: Yes  Method Used:  Pressure and Silver Nitrate  Patient tolerance:  Patient tolerated the procedure well with no immediate complications    
no

## 2025-02-17 NOTE — PROGRESS NOTES
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    Electronically signed by: Neel Miranda DO  Date:    10/18/2024  Time:    14:13  I independently reviewed the Xray images. Resected middle phalanx & distal 2/3 proximal phalanx 5th toe as PO.     X-Ray Foot Complete 3 view Right  Narrative: EXAMINATION:  XR FOOT COMPLETE 3 VIEW RIGHT    CLINICAL HISTORY:  . Unspecified open wound, right foot, initial encounter    TECHNIQUE:  AP, lateral, and oblique views of the right foot were performed.    COMPARISON:  None.    FINDINGS:  Transmetatarsal amputation with chronic fusion at the level of the RIGHT 2-3 mid metatarsal level.  Patchy osteopenia. No fracture or dislocation.  Lisfranc articulation is congruent.  Cartilage spaces are maintained.  Diffuse soft tissue swelling.  Osseous calcaneal spur.  Vascular calcifications.  No radiographic evidence for osteomyelitis yet at this is of concern, dedicated MRI could further evaluate.  Findings more consistent with skin and soft tissue infection/inflammation/cellulitis.  Impression: As above.    Electronically signed by: Shyam Reynolds MD  Date:    10/11/2024  Time:    13:41  X-Ray Foot Complete 3 view Left  Narrative: EXAMINATION:  XR FOOT COMPLETE 3 VIEW LEFT    CLINICAL HISTORY:  .  Unspecified open wound, left foot, initial encounter    TECHNIQUE:  AP, lateral and oblique views of the left foot  were performed.    COMPARISON:  02/27/2024    FINDINGS:  No fracture or dislocation.  Lisfranc articulation is congruent.  Interval irregularity at the level of the LEFT 5th PIP may be degenerative or post infectious.  Correlation suggested..  Cystic change at the level of the metatarsal head great toe.  Vascular calcifications.  Degenerative changes LEFT 1st DIP.  Flatfoot deformity.  Erosive changes stable at the level of the plantar aspect of the calcaneus.  Prominence T8 of process.  Talar beaking with degenerative change talonavicular joint.  Impression: As above.  Interval irregularity at the level of the LEFT 5th PIP for which correlation advised.  If osteomyelitis is of concern, MRI recommended for further evaluation.    Electronically signed by: Shyam Reynolds MD  Date:    10/11/2024  Time:    13:39  I independently reviewed the x-ray images.  Focus on L foot as ASX R. Bone changes to 5th PIPJ/ middle phalanx consistent w/ area of wound, suspicious for osteomyelitis.  No acute changes in the area of the plantar heel w/ chronic wound.    3/11/24      3/5/24          Problem List Items Addressed This Visit          Endocrine    Diabetes mellitus with ESRD (end-stage renal disease)    Overview   Pt. Has creat above baseline with decreased GFR prob. Due to volume depletion or ATN due to infection in RLE          Other Visit Diagnoses         Visit for wound check    -  Primary      Open wound of left heel, subsequent encounter          Diabetic polyneuropathy associated with diabetes mellitus due to underlying condition              L heel cleaned w/ Chloraprep & debrided.  Wet-dry Betadine dressing applied. May change dressing L heel q.o.d. w/ Betadine wet-to-dry. To otherwise be kept CDI.    F/u 2-3 wks. for wound check.        A total of 33 mins.was spent on chart review, patient visit & documentation.

## 2025-02-26 ENCOUNTER — TELEPHONE (OUTPATIENT)
Dept: PODIATRY | Facility: CLINIC | Age: 40
End: 2025-02-26
Payer: COMMERCIAL

## 2025-02-26 NOTE — TELEPHONE ENCOUNTER
Spoke with patient and moved his time to 1:45 pm on 2/28/25. Verbalized understanding and no further issues discussed.----- Message from Gautam sent at 2/26/2025  2:37 PM CST -----  Regarding: pt  Type:Patient Returning Call:Pt  Who Called: MAWho Left Message for Patient: Does the patient know what this is regarding? Missed call Best Call Back Number: Telephone Information:Mobile          [phone] Information:

## 2025-02-28 ENCOUNTER — OFFICE VISIT (OUTPATIENT)
Dept: PODIATRY | Facility: CLINIC | Age: 40
End: 2025-02-28
Payer: COMMERCIAL

## 2025-02-28 VITALS
SYSTOLIC BLOOD PRESSURE: 141 MMHG | WEIGHT: 234.13 LBS | BODY MASS INDEX: 31.71 KG/M2 | DIASTOLIC BLOOD PRESSURE: 78 MMHG | HEIGHT: 72 IN | HEART RATE: 86 BPM

## 2025-02-28 DIAGNOSIS — E08.42 DIABETIC POLYNEUROPATHY ASSOCIATED WITH DIABETES MELLITUS DUE TO UNDERLYING CONDITION: ICD-10-CM

## 2025-02-28 DIAGNOSIS — S91.302D OPEN WOUND OF LEFT HEEL, SUBSEQUENT ENCOUNTER: ICD-10-CM

## 2025-02-28 DIAGNOSIS — N18.6 DIABETES MELLITUS WITH ESRD (END-STAGE RENAL DISEASE): ICD-10-CM

## 2025-02-28 DIAGNOSIS — E11.22 DIABETES MELLITUS WITH ESRD (END-STAGE RENAL DISEASE): ICD-10-CM

## 2025-02-28 DIAGNOSIS — Z51.89 VISIT FOR WOUND CHECK: Primary | ICD-10-CM

## 2025-02-28 PROCEDURE — 99999 PR PBB SHADOW E&M-EST. PATIENT-LVL IV: CPT | Mod: PBBFAC,,, | Performed by: PODIATRIST

## 2025-02-28 RX ORDER — ERGOCALCIFEROL 1.25 MG/1
CAPSULE ORAL
COMMUNITY
Start: 2025-01-31

## 2025-02-28 RX ORDER — HALOPERIDOL 2 MG/1
TABLET ORAL
COMMUNITY

## 2025-02-28 NOTE — PROCEDURES
Wound Debridement L heel    Date/Time: 2/28/2025 1:45 PM    Performed by: Colleen Garcia DPM  Authorized by: Colleen Garcia DPM    Consent Done?:  Yes (Verbal)    Wound Details:    Location:  Left foot    Location:  Left Heel    Type of Debridement:  Excisional       Length (cm):  2.7       Width (cm):  2.2       Depth (cm):  0.2       Area (sq cm):  4.67       Percent Debrided (%):  100       Total Area Debrided (sq cm):  4.67    Depth of debridement:  Subcutaneous tissue    Tissue debrided:  Epidermis and Dermis    Devitalized tissue debrided:  Callus and Slough    Instruments:  Nippers  Bleeding:  Minimal  Hemostasis Achieved: Yes  Method Used:  Pressure  Patient tolerance:  Patient tolerated the procedure well with no immediate complications

## 2025-03-03 PROBLEM — L97.512 CHRONIC ULCER OF RIGHT FOOT WITH FAT LAYER EXPOSED: Status: RESOLVED | Noted: 2020-08-26 | Resolved: 2025-03-03

## 2025-03-03 PROBLEM — G47.01 INSOMNIA DUE TO MEDICAL CONDITION: Status: RESOLVED | Noted: 2023-08-24 | Resolved: 2025-03-03

## 2025-03-03 PROBLEM — L97.411: Chronic | Status: RESOLVED | Noted: 2021-06-16 | Resolved: 2025-03-03

## 2025-03-03 PROBLEM — I82.412 ACUTE DEEP VEIN THROMBOSIS (DVT) OF FEMORAL VEIN OF LEFT LOWER EXTREMITY: Status: RESOLVED | Noted: 2024-06-28 | Resolved: 2025-03-03

## 2025-03-03 PROBLEM — N18.6 DIABETES MELLITUS WITH ESRD (END-STAGE RENAL DISEASE): Status: RESOLVED | Noted: 2018-03-07 | Resolved: 2025-03-03

## 2025-03-03 PROBLEM — E11.22 DIABETES MELLITUS WITH ESRD (END-STAGE RENAL DISEASE): Status: RESOLVED | Noted: 2018-03-07 | Resolved: 2025-03-03

## 2025-03-03 PROBLEM — M79.9 SOFT TISSUE DISORDER: Chronic | Status: RESOLVED | Noted: 2024-09-25 | Resolved: 2025-03-03

## 2025-03-03 PROBLEM — M54.2 NECK PAIN: Status: RESOLVED | Noted: 2022-08-24 | Resolved: 2025-03-03
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
## 2025-03-03 PROBLEM — R63.8 INCREASED NUTRITIONAL NEEDS: Status: RESOLVED | Noted: 2020-08-30 | Resolved: 2025-03-03

## 2025-03-24 ENCOUNTER — TELEPHONE (OUTPATIENT)
Dept: PODIATRY | Facility: CLINIC | Age: 40
End: 2025-03-24
Payer: COMMERCIAL

## 2025-03-24 NOTE — TELEPHONE ENCOUNTER
Spoke with patient and scheduled hiom on 3/26/25 @2:30 pm. Pt verbalized understanding and no further issues discussed.----- Message from Urszula sent at 3/24/2025 10:47 AM CDT -----  Consult/AdvisoryName Of Caller:MARI Contact Preference:[phone] of call: Pt called to reschedule I'm not sure why it's not allowing me to reschedule please call to assist

## 2025-03-26 ENCOUNTER — OFFICE VISIT (OUTPATIENT)
Dept: PODIATRY | Facility: CLINIC | Age: 40
End: 2025-03-26
Payer: COMMERCIAL

## 2025-03-26 VITALS
SYSTOLIC BLOOD PRESSURE: 115 MMHG | DIASTOLIC BLOOD PRESSURE: 67 MMHG | HEART RATE: 101 BPM | BODY MASS INDEX: 31.48 KG/M2 | HEIGHT: 72 IN | WEIGHT: 232.38 LBS

## 2025-03-26 DIAGNOSIS — E11.42 DM TYPE 2 WITH DIABETIC PERIPHERAL NEUROPATHY: ICD-10-CM

## 2025-03-26 DIAGNOSIS — Z79.01 LONG TERM CURRENT USE OF ANTICOAGULANT: Primary | ICD-10-CM

## 2025-03-26 DIAGNOSIS — S91.301D OPEN WOUND OF RIGHT FOOT, SUBSEQUENT ENCOUNTER: ICD-10-CM

## 2025-03-26 DIAGNOSIS — N18.6 DIABETES MELLITUS WITH ESRD (END-STAGE RENAL DISEASE): ICD-10-CM

## 2025-03-26 DIAGNOSIS — L97.422 HEEL ULCER, LEFT, WITH FAT LAYER EXPOSED: ICD-10-CM

## 2025-03-26 DIAGNOSIS — E11.22 DIABETES MELLITUS WITH ESRD (END-STAGE RENAL DISEASE): ICD-10-CM

## 2025-03-26 DIAGNOSIS — Z51.89 VISIT FOR WOUND CHECK: ICD-10-CM

## 2025-03-26 PROCEDURE — 99999 PR PBB SHADOW E&M-EST. PATIENT-LVL IV: CPT | Mod: PBBFAC,,, | Performed by: PODIATRIST

## 2025-03-26 PROCEDURE — 3044F HG A1C LEVEL LT 7.0%: CPT | Mod: CPTII,S$GLB,, | Performed by: PODIATRIST

## 2025-03-26 PROCEDURE — 99213 OFFICE O/P EST LOW 20 MIN: CPT | Mod: 25,S$GLB,, | Performed by: PODIATRIST

## 2025-03-26 PROCEDURE — 11042 DBRDMT SUBQ TIS 1ST 20SQCM/<: CPT | Mod: S$GLB,,, | Performed by: PODIATRIST

## 2025-03-26 PROCEDURE — 1159F MED LIST DOCD IN RCRD: CPT | Mod: CPTII,S$GLB,, | Performed by: PODIATRIST

## 2025-03-26 PROCEDURE — 4010F ACE/ARB THERAPY RXD/TAKEN: CPT | Mod: CPTII,S$GLB,, | Performed by: PODIATRIST

## 2025-03-26 PROCEDURE — 3074F SYST BP LT 130 MM HG: CPT | Mod: CPTII,S$GLB,, | Performed by: PODIATRIST

## 2025-03-26 PROCEDURE — 3078F DIAST BP <80 MM HG: CPT | Mod: CPTII,S$GLB,, | Performed by: PODIATRIST

## 2025-03-26 PROCEDURE — 3008F BODY MASS INDEX DOCD: CPT | Mod: CPTII,S$GLB,, | Performed by: PODIATRIST

## 2025-03-26 NOTE — PROGRESS NOTES
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 images. Resected middle phalanx & distal 2/3 proximal phalanx 5th toe as PO.     X-Ray Foot Complete 3 view Right  Narrative: EXAMINATION:  XR FOOT COMPLETE 3 VIEW RIGHT    CLINICAL HISTORY:  . Unspecified open wound, right foot, initial encounter    TECHNIQUE:  AP, lateral, and oblique views of the right foot were performed.    COMPARISON:  None.    FINDINGS:  Transmetatarsal amputation with chronic fusion at the level of the RIGHT 2-3 mid metatarsal level.  Patchy osteopenia. No fracture or dislocation.  Lisfranc articulation is congruent.  Cartilage spaces are maintained.  Diffuse soft tissue swelling.  Osseous calcaneal spur.  Vascular calcifications.  No radiographic evidence for osteomyelitis yet at this is of concern, dedicated MRI could further evaluate.  Findings more consistent with skin and soft tissue infection/inflammation/cellulitis.  Impression: As above.    Electronically signed by: Shyam Reynolds MD  Date:    10/11/2024  Time:    13:41  X-Ray Foot Complete 3 view Left  Narrative: EXAMINATION:  XR FOOT COMPLETE 3 VIEW LEFT    CLINICAL HISTORY:  .  Unspecified open wound, left foot, initial encounter    TECHNIQUE:  AP, lateral and oblique views of the left foot were performed.    COMPARISON:  02/27/2024    FINDINGS:  No fracture or dislocation.  Lisfranc articulation  is congruent.  Interval irregularity at the level of the LEFT 5th PIP may be degenerative or post infectious.  Correlation suggested..  Cystic change at the level of the metatarsal head great toe.  Vascular calcifications.  Degenerative changes LEFT 1st DIP.  Flatfoot deformity.  Erosive changes stable at the level of the plantar aspect of the calcaneus.  Prominence T8 of process.  Talar beaking with degenerative change talonavicular joint.  Impression: As above.  Interval irregularity at the level of the LEFT 5th PIP for which correlation advised.  If osteomyelitis is of concern, MRI recommended for further evaluation.    Electronically signed by: Shyam Reynolds MD  Date:    10/11/2024  Time:    13:39  I independently reviewed the x-ray images.  Focus on L foot as ASX R. Bone changes to 5th PIPJ/ middle phalanx consistent w/ area of wound, suspicious for osteomyelitis.  No acute changes in the area of the plantar heel w/ chronic wound.    3/11/24      3/5/24          Problem List Items Addressed This Visit          Orthopedic    Open wound of right foot    Relevant Orders    Wound Debridement B/L (Completed)    Heel ulcer, left, with fat layer exposed    Relevant Orders    Wound Debridement B/L (Completed)     Other Visit Diagnoses         Long term current use of anticoagulant    -  Primary      Diabetes mellitus with ESRD (end-stage renal disease)          DM type 2 with diabetic peripheral neuropathy          Visit for wound check            L heel cleaned w/ Chloraprep & debrided.  Wet-dry Betadine dressing applied. May change dressing L heel q.o.d. w/ Betadine wet-to-dry. To otherwise be kept CDI.    F/u 2-3 wks. for wound check.        A total of 27 mins.was spent on chart review, patient visit & documentation.

## 2025-04-01 NOTE — PROCEDURES
Wound Debridement B/L    Date/Time: 3/26/2025 2:30 PM    Performed by: Colleen Garcia DPM  Authorized by: Colleen Garcia DPM    Consent Done?:  Yes (Verbal)    Wound Details:    Location:  Left foot    Location:  Left Heel    Type of Debridement:  Excisional       Length (cm):  2.2       Width (cm):  1.9       Depth (cm):  0.2       Area (sq cm):  3.28       Percent Debrided (%):  100       Total Area Debrided (sq cm):  3.28    Depth of debridement:  Subcutaneous tissue    Tissue debrided:  Epidermis and Dermis    Devitalized tissue debrided:  Callus, Slough and Fibrin    Instruments:  Nippers and Blade  Bleeding:  None    Additional wounds:  1    2nd Wound Details:     Location:  Right foot    Location:  Right Plantar    Location:  Right Plantar    Type of Debridement:  Excisional       Length (cm):  2.4       Area (sq cm):  5.84       Width (cm):  3.1       Percent Debrided (%):  100       Depth (cm):  0.3       Total Area Debrided (sq cm):  5.84    Depth of debridement:  Subcutaneous tissue    Tissue debrided:  Epidermis, Dermis and Subcutaneous    Devitalized tissue debrided:  Fibrin, Callus and Slough    Instruments:  Nippers and Blade  Bleeding:  Minimal  Method Used:  Pressure  Patient tolerance:  Patient tolerated the procedure well with no immediate complications

## 2025-04-25 ENCOUNTER — TELEPHONE (OUTPATIENT)
Dept: PODIATRY | Facility: CLINIC | Age: 40
End: 2025-04-25
Payer: COMMERCIAL

## 2025-04-25 NOTE — TELEPHONE ENCOUNTER
Spoke with patient and scheduled him on 4/28/25 @4:15pm. Pt verbalized understanding and no further issues dicussed.----- Message from Thien sent at 4/25/2025 10:12 AM CDT -----  Regarding: Reschedule Existing Appointment  Contact: [phone]  Reschedule Existing Appointment Current appt date: 6/13/2025 Type of appt : Wound Check Physician: Dr. Garcia Reason for rescheduling: Needs sooner appt Caller: Brenda Contact Preference:  [phone]

## 2025-04-27 NOTE — PROGRESS NOTES
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 Neel Miranda DO  Date:    10/18/2024  Time:    14:13  I independently reviewed the Xray images. Resected middle phalanx & distal 2/3 proximal phalanx 5th toe as PO.     X-Ray Foot Complete 3 view Right  Narrative: EXAMINATION:  XR FOOT COMPLETE 3 VIEW RIGHT    CLINICAL HISTORY:  . Unspecified open wound, right foot, initial encounter    TECHNIQUE:  AP, lateral, and oblique views of the right foot were performed.    COMPARISON:  None.    FINDINGS:  Transmetatarsal amputation with chronic fusion at the level of the RIGHT 2-3 mid metatarsal level.  Patchy osteopenia. No fracture or dislocation.  Lisfranc articulation is congruent.  Cartilage spaces are maintained.  Diffuse soft tissue swelling.  Osseous calcaneal spur.  Vascular calcifications.  No radiographic evidence for osteomyelitis yet at this is of concern, dedicated MRI could further evaluate.  Findings more consistent with skin and soft tissue infection/inflammation/cellulitis.  Impression: As above.    Electronically signed by: Shyam Reynolds MD  Date:    10/11/2024  Time:    13:41  X-Ray Foot Complete 3 view Left  Narrative: EXAMINATION:  XR FOOT COMPLETE 3 VIEW LEFT    CLINICAL HISTORY:  .  Unspecified open wound, left foot, initial encounter    TECHNIQUE:  AP, lateral and oblique views of the left foot were  performed.    COMPARISON:  02/27/2024    FINDINGS:  No fracture or dislocation.  Lisfranc articulation is congruent.  Interval irregularity at the level of the LEFT 5th PIP may be degenerative or post infectious.  Correlation suggested..  Cystic change at the level of the metatarsal head great toe.  Vascular calcifications.  Degenerative changes LEFT 1st DIP.  Flatfoot deformity.  Erosive changes stable at the level of the plantar aspect of the calcaneus.  Prominence T8 of process.  Talar beaking with degenerative change talonavicular joint.  Impression: As above.  Interval irregularity at the level of the LEFT 5th PIP for which correlation advised.  If osteomyelitis is of concern, MRI recommended for further evaluation.    Electronically signed by: Shyam Reynolds MD  Date:    10/11/2024  Time:    13:39  I independently reviewed the x-ray images.  Focus on L foot as ASX R. Bone changes to 5th PIPJ/ middle phalanx consistent w/ area of wound, suspicious for osteomyelitis.  No acute changes in the area of the plantar heel w/ chronic wound.    3/11/24      3/5/24          Problem List Items Addressed This Visit          Cardiac/Vascular    PAD (peripheral artery disease) (Chronic)       Orthopedic    Heel ulcer, left, with fat layer exposed    Relevant Orders    Wound Debridement L heel & R arch    History of transmetatarsal amputation of right foot     Other Visit Diagnoses         Visit for wound check    -  Primary      Corn of toe          Ingrown toenail of left foot          Chronic ulcer of plantar surface of midfoot, right, with fat layer exposed        Relevant Orders    Wound Debridement L heel & R arch      Long term current use of anticoagulant            L heel & R plantar arch cleaned w/ Chloraprep & debrided.  Wet-dry Betadine dressing applied. May change dressing L heel q.o.d. w/ Betadine wet-to-dry. To otherwise be kept CDI.    F/u 2-3 wks. for wound check.    Courtesy debridement lateral nail border  R 5th toe.        A total of 25 mins.was spent on chart review, patient visit & documentation.

## 2025-04-28 ENCOUNTER — OFFICE VISIT (OUTPATIENT)
Dept: PODIATRY | Facility: CLINIC | Age: 40
End: 2025-04-28
Payer: COMMERCIAL

## 2025-04-28 VITALS
SYSTOLIC BLOOD PRESSURE: 179 MMHG | HEIGHT: 72 IN | DIASTOLIC BLOOD PRESSURE: 80 MMHG | HEART RATE: 85 BPM | WEIGHT: 243.19 LBS | BODY MASS INDEX: 32.94 KG/M2

## 2025-04-28 DIAGNOSIS — L97.422 HEEL ULCER, LEFT, WITH FAT LAYER EXPOSED: ICD-10-CM

## 2025-04-28 DIAGNOSIS — Z51.89 VISIT FOR WOUND CHECK: Primary | ICD-10-CM

## 2025-04-28 DIAGNOSIS — L97.412 CHRONIC ULCER OF PLANTAR SURFACE OF MIDFOOT, RIGHT, WITH FAT LAYER EXPOSED: ICD-10-CM

## 2025-04-28 DIAGNOSIS — Z89.431 HISTORY OF TRANSMETATARSAL AMPUTATION OF RIGHT FOOT: ICD-10-CM

## 2025-04-28 DIAGNOSIS — Z79.01 LONG TERM CURRENT USE OF ANTICOAGULANT: ICD-10-CM

## 2025-04-28 DIAGNOSIS — L84 CORN OF TOE: ICD-10-CM

## 2025-04-28 DIAGNOSIS — I73.9 PAD (PERIPHERAL ARTERY DISEASE): Chronic | ICD-10-CM

## 2025-04-28 DIAGNOSIS — L60.0 INGROWN TOENAIL OF LEFT FOOT: ICD-10-CM

## 2025-04-28 PROCEDURE — 3077F SYST BP >= 140 MM HG: CPT | Mod: CPTII,S$GLB,, | Performed by: PODIATRIST

## 2025-04-28 PROCEDURE — 99213 OFFICE O/P EST LOW 20 MIN: CPT | Mod: 25,S$GLB,, | Performed by: PODIATRIST

## 2025-04-28 PROCEDURE — 99999 PR PBB SHADOW E&M-EST. PATIENT-LVL III: CPT | Mod: PBBFAC,,, | Performed by: PODIATRIST

## 2025-04-28 PROCEDURE — 3044F HG A1C LEVEL LT 7.0%: CPT | Mod: CPTII,S$GLB,, | Performed by: PODIATRIST

## 2025-04-28 PROCEDURE — 1159F MED LIST DOCD IN RCRD: CPT | Mod: CPTII,S$GLB,, | Performed by: PODIATRIST

## 2025-04-28 PROCEDURE — 3008F BODY MASS INDEX DOCD: CPT | Mod: CPTII,S$GLB,, | Performed by: PODIATRIST

## 2025-04-28 PROCEDURE — 4010F ACE/ARB THERAPY RXD/TAKEN: CPT | Mod: CPTII,S$GLB,, | Performed by: PODIATRIST

## 2025-04-28 PROCEDURE — 3079F DIAST BP 80-89 MM HG: CPT | Mod: CPTII,S$GLB,, | Performed by: PODIATRIST

## 2025-04-28 PROCEDURE — 11042 DBRDMT SUBQ TIS 1ST 20SQCM/<: CPT | Mod: S$GLB,,, | Performed by: PODIATRIST

## 2025-05-01 NOTE — PROCEDURES
Wound Debridement L heel & R arch    Date/Time: 4/28/2025 4:15 PM    Performed by: Colleen Garcia DPM  Authorized by: Colleen Garcia DPM    Consent Done?:  Yes (Verbal)    Wound Details:    Location:  Left foot    Location:  Left Heel    Type of Debridement:  Excisional       Length (cm):  2.1       Width (cm):  2.1       Depth (cm):  0.3       Area (sq cm):  3.46       Percent Debrided (%):  100       Total Area Debrided (sq cm):  3.46    Depth of debridement:  Subcutaneous tissue    Tissue debrided:  Epidermis and Dermis    Devitalized tissue debrided:  Callus and Slough    Instruments:  Nippers and Blade  Bleeding:  Minimal  Hemostasis Achieved: Yes  Method Used:  Silver Nitrate    2nd Wound Details:     Location:  Right foot    Location:  Right Plantar    Location:  Right Plantar    Type of Debridement:  Excisional       Length (cm):  3.3       Area (sq cm):  6.74       Width (cm):  2.6       Percent Debrided (%):  100       Depth (cm):  0.3       Total Area Debrided (sq cm):  6.74    Depth of debridement:  Subcutaneous tissue    Tissue debrided:  Epidermis and Dermis    Devitalized tissue debrided:  Fibrin, Slough and Callus    Instruments:  Blade and Nippers  Bleeding:  Minimal  Hemostasis Achieved: Yes    Method Used:  Silver Nitrate  Patient tolerance:  Patient tolerated the procedure well with no immediate complications

## 2025-05-02 ENCOUNTER — TELEPHONE (OUTPATIENT)
Dept: PODIATRY | Facility: CLINIC | Age: 40
End: 2025-05-02
Payer: COMMERCIAL

## 2025-05-02 NOTE — TELEPHONE ENCOUNTER
----- Message from Farzaneh sent at 5/2/2025  1:52 PM CDT -----  Regarding: sched appt nail care  Contact: Patient can be contacted @# [phone]  PT called w/his wound care clinic where he will now be having his wound care done. PT states he will be going elsewhere . PT wanted to change his appt on the 5/12 to a nail care appt. I was unable to sched as was showing INS OON for [facility]. Note placed on the appt and message sent to provider. I was unable to verify the other insurances they said PT hadPatient can be contacted @# [phone]

## 2025-05-02 NOTE — TELEPHONE ENCOUNTER
I spoke with patient and spouse ,Viv, dialysis referred patient for treatment of wouund, but patient prefers to stay with Dr Garcia instead.  Confirmed appt with Dr. Garcia on 5/12/25 @ 3:45 PM.  No changes with insurance per spouse.

## 2025-05-14 ENCOUNTER — TELEPHONE (OUTPATIENT)
Dept: PODIATRY | Facility: CLINIC | Age: 40
End: 2025-05-14
Payer: COMMERCIAL

## 2025-05-14 NOTE — TELEPHONE ENCOUNTER
Spoke with patient and scheduled him for 5/19/25 @1:45 pm. Pt verbalized understanding and no further issues discussed.----- Message from Layla sent at 5/14/2025 10:10 AM CDT -----  Regarding: Appointment  Contact: [phone]  Type:  Needs Medical AdviceWho Called: Randi (please be specific): wound care Would the patient rather a call back or a response via [facility]? [facility] Call Back Number: [phone] Information: Calling in regards to rescheduling appointment as soon as possible. Please call and schedule.

## 2025-05-19 ENCOUNTER — OFFICE VISIT (OUTPATIENT)
Dept: PODIATRY | Facility: CLINIC | Age: 40
End: 2025-05-19
Payer: COMMERCIAL

## 2025-05-19 VITALS
HEIGHT: 72 IN | BODY MASS INDEX: 32.19 KG/M2 | DIASTOLIC BLOOD PRESSURE: 77 MMHG | SYSTOLIC BLOOD PRESSURE: 157 MMHG | WEIGHT: 237.63 LBS | HEART RATE: 93 BPM

## 2025-05-19 DIAGNOSIS — L97.412 CHRONIC ULCER OF PLANTAR SURFACE OF MIDFOOT, RIGHT, WITH FAT LAYER EXPOSED: ICD-10-CM

## 2025-05-19 DIAGNOSIS — Z89.431 HISTORY OF TRANSMETATARSAL AMPUTATION OF RIGHT FOOT: ICD-10-CM

## 2025-05-19 DIAGNOSIS — N18.6 DIABETES MELLITUS WITH ESRD (END-STAGE RENAL DISEASE): ICD-10-CM

## 2025-05-19 DIAGNOSIS — B35.1 ONYCHOMYCOSIS OF TOENAIL: ICD-10-CM

## 2025-05-19 DIAGNOSIS — E08.42 DIABETIC POLYNEUROPATHY ASSOCIATED WITH DIABETES MELLITUS DUE TO UNDERLYING CONDITION: Primary | ICD-10-CM

## 2025-05-19 DIAGNOSIS — L97.422 ULCER OF HEEL AND MIDFOOT, LEFT, WITH FAT LAYER EXPOSED: ICD-10-CM

## 2025-05-19 DIAGNOSIS — E11.22 DIABETES MELLITUS WITH ESRD (END-STAGE RENAL DISEASE): ICD-10-CM

## 2025-05-19 PROCEDURE — 99999 PR PBB SHADOW E&M-EST. PATIENT-LVL III: CPT | Mod: PBBFAC,,, | Performed by: PODIATRIST

## 2025-05-19 RX ORDER — CARVEDILOL 25 MG/1
25 TABLET ORAL 2 TIMES DAILY
COMMUNITY
Start: 2025-04-12

## 2025-05-19 RX ORDER — DOXYCYCLINE HYCLATE 100 MG
100 TABLET ORAL 2 TIMES DAILY
COMMUNITY

## 2025-05-19 NOTE — PROCEDURES
Wound Debridement B/L, L heel & R anterior MLA    Date/Time: 5/19/2025 1:45 PM    Performed by: Colleen Garcia DPM  Authorized by: Colleen Garcia DPM    Consent Done?:  Yes (Verbal)    Wound Details:    Location:  Right foot    Location:  Right Plantar    Type of Debridement:  Excisional       Length (cm):  2.9       Width (cm):  3.1       Depth (cm):  0.3       Area (sq cm):  7.06       Percent Debrided (%):  100       Total Area Debrided (sq cm):  7.06    Depth of debridement:  Subcutaneous tissue    Tissue debrided:  Epidermis and Dermis    Devitalized tissue debrided:  Callus    Instruments:  Nippers  Bleeding:  Minimal  Hemostasis Achieved: Yes  Method Used:  Pressure    Additional wounds:  1    2nd Wound Details:     Location:  Left foot    Location:  Left Heel    Location:  Left Heel    Type of Debridement:  Excisional       Length (cm):  2.2       Area (sq cm):  3.97       Width (cm):  2.3       Percent Debrided (%):  100       Depth (cm):  0.3       Total Area Debrided (sq cm):  3.97    Depth of debridement:  Subcutaneous tissue    Tissue debrided:  Epidermis and Dermis    Devitalized tissue debrided:  Callus and Slough    Instruments:  Nippers  Bleeding:  Minimal  Hemostasis Achieved: Yes    Method Used:  Pressure  Patient tolerance:  Patient tolerated the procedure well with no immediate complications  Nail & clavus debridement L    Date/Time: 5/19/2025 1:45 PM    Performed by: Colleen Garcia DPM  Authorized by: Colleen Garcia DPM    Consent Done?:  Yes (Verbal)  Hyperkeratotic Skin Lesions?: Yes    Number of trimmed lesions:  1  Location(s):  Left 4th Toe    Nail Care Type:  Debride(Left 3rd Toe and Left 4th Toe)  Patient tolerance:  Patient tolerated the procedure well with no immediate complications

## 2025-05-19 NOTE — PROGRESS NOTES
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   Psychiatric: His behavior is normal. Affect normal. His mood appears not anxious.   Vitals reviewed.    X-Ray Toe 2 or More Views Left  Narrative: EXAMINATION:  XR TOE 2 OR MORE VIEWS LEFT    CLINICAL HISTORY:  post op;    TECHNIQUE:  Three views of the left toes were performed    COMPARISON:  Left foot 10/11/2024    FINDINGS:  Postoperative change status post osteotomy majority of the left 5th proximal and middle phalanges.  There is soft tissue swelling fullness at the site.  Continued advanced degenerative change of the 1st interphalangeal joint.  Continued hallux valgus deformity with cystic change in the medial aspect of the 1st metatarsal head.  Prominent vascular calcifications again seen.  Clinical correlation and follow-up advised  Impression: Please see above    Electronically signed by: Neel Miranda DO  Date:    10/18/2024  Time:    14:13  I independently reviewed the Xray images. Resected middle phalanx & distal 2/3 proximal phalanx 5th toe as PO.     X-Ray Foot Complete 3 view Right  Narrative: EXAMINATION:  XR FOOT COMPLETE 3 VIEW RIGHT    CLINICAL HISTORY:  . Unspecified open wound, right foot, initial encounter    TECHNIQUE:  AP, lateral, and oblique views of the right foot were performed.    COMPARISON:  None.    FINDINGS:  Transmetatarsal amputation with chronic  fusion at the level of the RIGHT 2-3 mid metatarsal level.  Patchy osteopenia. No fracture or dislocation.  Lisfranc articulation is congruent.  Cartilage spaces are maintained.  Diffuse soft tissue swelling.  Osseous calcaneal spur.  Vascular calcifications.  No radiographic evidence for osteomyelitis yet at this is of concern, dedicated MRI could further evaluate.  Findings more consistent with skin and soft tissue infection/inflammation/cellulitis.  Impression: As above.    Electronically signed by: Shyam Reynolds MD  Date:    10/11/2024  Time:    13:41  X-Ray Foot Complete 3 view Left  Narrative: EXAMINATION:  XR FOOT COMPLETE 3 VIEW LEFT    CLINICAL HISTORY:  .  Unspecified open wound, left foot, initial encounter    TECHNIQUE:  AP, lateral and oblique views of the left foot were performed.    COMPARISON:  02/27/2024    FINDINGS:  No fracture or dislocation.  Lisfranc articulation is congruent.  Interval irregularity at the level of the LEFT 5th PIP may be degenerative or post infectious.  Correlation suggested..  Cystic change at the level of the metatarsal head great toe.  Vascular calcifications.  Degenerative changes LEFT 1st DIP.  Flatfoot deformity.  Erosive changes stable at the level of the plantar aspect of the calcaneus.  Prominence T8 of process.  Talar beaking with degenerative change talonavicular joint.  Impression: As above.  Interval irregularity at the level of the LEFT 5th PIP for which correlation advised.  If osteomyelitis is of concern, MRI recommended for further evaluation.    Electronically signed by: Shyam Reynolds MD  Date:    10/11/2024  Time:    13:39  I independently reviewed the x-ray images.  Focus on L foot as ASX R. Bone changes to 5th PIPJ/ middle phalanx consistent w/ area of wound, suspicious for osteomyelitis.  No acute changes in the area of the plantar heel w/ chronic wound.    3/11/24      3/5/24          Problem List Items Addressed This Visit          Orthopedic     History of transmetatarsal amputation of right foot     Other Visit Diagnoses         Diabetic polyneuropathy associated with diabetes mellitus due to underlying condition    -  Primary      Diabetes mellitus with ESRD (end-stage renal disease)          Ulcer of heel and midfoot, left, with fat layer exposed        Relevant Orders    Wound Debridement B/L, L heel & R anterior MLA      Chronic ulcer of plantar surface of midfoot, right, with fat layer exposed        Relevant Orders    Wound Debridement B/L, L heel & R anterior MLA        Plan/ Treatment:     L heel & R plantar arch cleaned w/ Chloraprep & debrided sharply.  Wet-dry Betadine dressing applied. May change dressing q.o.d. w/ Betadine wet-to-dry. To otherwise be kept CDI.    F/u 2-3 wks. for wound check.        A total of 31 mins.was spent on chart review, patient visit & documentation.

## 2025-05-23 NOTE — PROGRESS NOTES
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 exhibits normal muscle tone. Gait (surgical heel wedge shoe L) abnormal.   Skin: Lesion noted. No bruising, no ecchymosis, no rash and no abscess (resolved lateral L heel) noted. Papular rash: see pix.. No erythema. There are signs of injury.   See pix.    L heel ulcer plantar lateral w/ hyperkeratotic tissue rim, measures 2.2 x 2.3 x 0.2 cm.  R midfoot ulcer 2.9 x 3.1 x 0.2 cm.  Both ulcers w/ granular base, no undermining nor probe deep    Dorsal mid 5th toe L healed.   Psychiatric: His behavior is normal. Affect normal. His mood appears not anxious.   Vitals reviewed.    X-Ray Toe 2 or More Views Left  Narrative: EXAMINATION:  XR TOE 2 OR MORE VIEWS LEFT    CLINICAL HISTORY:  post op;    TECHNIQUE:  Three views of the left toes were performed    COMPARISON:  Left foot 10/11/2024    FINDINGS:  Postoperative change status post osteotomy majority of the left 5th proximal and middle phalanges.  There is soft tissue swelling fullness at the site.  Continued advanced degenerative change of the 1st interphalangeal joint.  Continued hallux valgus deformity with cystic change in the medial aspect of the 1st metatarsal head.  Prominent vascular calcifications again seen.  Clinical correlation and follow-up advised  Impression: Please see above    Electronically signed by: Neel Miranda  DO  Date:    10/18/2024  Time:    14:13  I independently reviewed the Xray images. Resected middle phalanx & distal 2/3 proximal phalanx 5th toe as PO.     X-Ray Foot Complete 3 view Right  Narrative: EXAMINATION:  XR FOOT COMPLETE 3 VIEW RIGHT    CLINICAL HISTORY:  . Unspecified open wound, right foot, initial encounter    TECHNIQUE:  AP, lateral, and oblique views of the right foot were performed.    COMPARISON:  None.    FINDINGS:  Transmetatarsal amputation with chronic fusion at the level of the RIGHT 2-3 mid metatarsal level.  Patchy osteopenia. No fracture or dislocation.  Lisfranc articulation is congruent.  Cartilage spaces are maintained.  Diffuse soft tissue swelling.  Osseous calcaneal spur.  Vascular calcifications.  No radiographic evidence for osteomyelitis yet at this is of concern, dedicated MRI could further evaluate.  Findings more consistent with skin and soft tissue infection/inflammation/cellulitis.  Impression: As above.    Electronically signed by: Shyam Reynolds MD  Date:    10/11/2024  Time:    13:41  X-Ray Foot Complete 3 view Left  Narrative: EXAMINATION:  XR FOOT COMPLETE 3 VIEW LEFT    CLINICAL HISTORY:  .  Unspecified open wound, left foot, initial encounter    TECHNIQUE:  AP, lateral and oblique views of the left foot were performed.    COMPARISON:  02/27/2024    FINDINGS:  No fracture or dislocation.  Lisfranc articulation is congruent.  Interval irregularity at the level of the LEFT 5th PIP may be degenerative or post infectious.  Correlation suggested..  Cystic change at the level of the metatarsal head great toe.  Vascular calcifications.  Degenerative changes LEFT 1st DIP.  Flatfoot deformity.  Erosive changes stable at the level of the plantar aspect of the calcaneus.  Prominence T8 of process.  Talar beaking with degenerative change talonavicular joint.  Impression: As above.  Interval irregularity at the level of the LEFT 5th PIP for which correlation advised.  If  osteomyelitis is of concern, MRI recommended for further evaluation.    Electronically signed by: Shyam Reynolds MD  Date:    10/11/2024  Time:    13:39  I independently reviewed the x-ray images.  Focus on L foot as ASX R. Bone changes to 5th PIPJ/ middle phalanx consistent w/ area of wound, suspicious for osteomyelitis.  No acute changes in the area of the plantar heel w/ chronic wound.    3/11/24      3/5/24          Problem List Items Addressed This Visit          Orthopedic    Heel ulcer, left, with fat layer exposed    Relevant Orders    Wound Debridement L heel & R mid/forefoot    History of transmetatarsal amputation of right foot     Other Visit Diagnoses         Visit for wound check    -  Primary      Chronic ulcer of plantar surface of midfoot, right, with fat layer exposed        Relevant Orders    Wound Debridement L heel & R mid/forefoot      DM type 2 with diabetic peripheral neuropathy          Diabetes mellitus with ESRD (end-stage renal disease)            Plan/ Treatment:     - Shoe inspection. Diabetic Foot Education. Patient reminded of the importance of good blood sugar control to help prevent podiatric complications of diabetes. Patient instructed on proper foot hygeine. We discussed wearing proper shoe gear, daily foot inspections, never walking w/out protective shoe gear, annual DM foot exam, sooner prn.       L heel & R plantar midfoot ulcers cleaned w/ Chloraprep & debrided sharply.  Wet-dry Betadine dressing applied. May change dressing q.o.d. w/ Betadine wet-to-dry. To otherwise be kept CDI; no soaking.  Advised on offloading of wounds (felt pads insufficient d/t compression w/ WB).  Dispensed heel wedge shoe L & PPT cut-out pads to offload R.    F/u 3 wks. for wound check, sooner prn.    Advised on continued offloading all times WB including @ home, to allow for better healing potential.        A total of 33 mins.was spent on chart review, patient visit & documentation.

## 2025-06-11 ENCOUNTER — OFFICE VISIT (OUTPATIENT)
Dept: PODIATRY | Facility: CLINIC | Age: 40
End: 2025-06-11
Payer: COMMERCIAL

## 2025-06-11 VITALS
WEIGHT: 238.44 LBS | HEART RATE: 93 BPM | BODY MASS INDEX: 32.29 KG/M2 | DIASTOLIC BLOOD PRESSURE: 82 MMHG | SYSTOLIC BLOOD PRESSURE: 149 MMHG | HEIGHT: 72 IN

## 2025-06-11 DIAGNOSIS — L97.412 CHRONIC ULCER OF PLANTAR SURFACE OF MIDFOOT, RIGHT, WITH FAT LAYER EXPOSED: ICD-10-CM

## 2025-06-11 DIAGNOSIS — N18.6 DIABETES MELLITUS WITH ESRD (END-STAGE RENAL DISEASE): ICD-10-CM

## 2025-06-11 DIAGNOSIS — Z51.89 VISIT FOR WOUND CHECK: Primary | ICD-10-CM

## 2025-06-11 DIAGNOSIS — E11.42 DM TYPE 2 WITH DIABETIC PERIPHERAL NEUROPATHY: ICD-10-CM

## 2025-06-11 DIAGNOSIS — L97.422 HEEL ULCER, LEFT, WITH FAT LAYER EXPOSED: ICD-10-CM

## 2025-06-11 DIAGNOSIS — Z89.431 HISTORY OF TRANSMETATARSAL AMPUTATION OF RIGHT FOOT: ICD-10-CM

## 2025-06-11 DIAGNOSIS — E11.22 DIABETES MELLITUS WITH ESRD (END-STAGE RENAL DISEASE): ICD-10-CM

## 2025-06-11 PROCEDURE — 97597 DBRDMT OPN WND 1ST 20 CM/<: CPT | Mod: XS,S$GLB,, | Performed by: PODIATRIST

## 2025-06-11 PROCEDURE — 3008F BODY MASS INDEX DOCD: CPT | Mod: CPTII,S$GLB,, | Performed by: PODIATRIST

## 2025-06-11 PROCEDURE — 99214 OFFICE O/P EST MOD 30 MIN: CPT | Mod: 25,S$GLB,, | Performed by: PODIATRIST

## 2025-06-11 PROCEDURE — 99999 PR PBB SHADOW E&M-EST. PATIENT-LVL IV: CPT | Mod: PBBFAC,,, | Performed by: PODIATRIST

## 2025-06-11 PROCEDURE — 3044F HG A1C LEVEL LT 7.0%: CPT | Mod: CPTII,S$GLB,, | Performed by: PODIATRIST

## 2025-06-11 PROCEDURE — 3077F SYST BP >= 140 MM HG: CPT | Mod: CPTII,S$GLB,, | Performed by: PODIATRIST

## 2025-06-11 PROCEDURE — 11042 DBRDMT SUBQ TIS 1ST 20SQCM/<: CPT | Mod: S$GLB,,, | Performed by: PODIATRIST

## 2025-06-11 PROCEDURE — 4010F ACE/ARB THERAPY RXD/TAKEN: CPT | Mod: CPTII,S$GLB,, | Performed by: PODIATRIST

## 2025-06-11 PROCEDURE — 3079F DIAST BP 80-89 MM HG: CPT | Mod: CPTII,S$GLB,, | Performed by: PODIATRIST

## 2025-06-11 PROCEDURE — 1159F MED LIST DOCD IN RCRD: CPT | Mod: CPTII,S$GLB,, | Performed by: PODIATRIST

## 2025-06-11 RX ORDER — HYDROXYZINE HYDROCHLORIDE 25 MG/1
TABLET, FILM COATED ORAL
COMMUNITY

## 2025-06-11 RX ORDER — DALBAVANCIN 500 MG/25ML
INJECTION, POWDER, FOR SOLUTION INTRAVENOUS
COMMUNITY
Start: 2025-06-11

## 2025-06-23 NOTE — PROCEDURES
Wound Debridement L heel & R mid/forefoot    Date/Time: 6/11/2025 9:45 AM    Performed by: Colleen Garcia DPM  Authorized by: Colleen Garcia DPM    Consent Done?:  Yes (Verbal)    Wound Details:    Location:  Right foot    Location:  Right Plantar    Type of Debridement:  Excisional       Length (cm):  2.9       Width (cm):  3.1       Depth (cm):  0.2       Area (sq cm):  7.06       Percent Debrided (%):  100       Total Area Debrided (sq cm):  7.06    Depth of debridement:  Epidermis/Dermis    Tissue debrided:  Hypergranulation, Epidermis and Dermis    Devitalized tissue debrided:  Callus and Slough    Instruments:  Nippers and Blade  Bleeding:  Minimal  Hemostasis Achieved: Yes  Method Used:  Pressure and Silver Nitrate    Additional wounds:  1    2nd Wound Details:     Location:  Left foot    Location:  Left Heel    Location:  Left Heel    Type of Debridement:  Excisional       Length (cm):  2.2       Area (sq cm):  3.97       Width (cm):  2.3       Percent Debrided (%):  100       Depth (cm):  0.2       Total Area Debrided (sq cm):  3.97    Depth of debridement:  Subcutaneous tissue    Tissue debrided:  Epidermis, Dermis and Hypergranulation    Devitalized tissue debrided:  Callus and Slough    Instruments:  Nippers and Blade  Bleeding:  Minimal  Hemostasis Achieved: Yes    Method Used:  Pressure and Silver Nitrate  Patient tolerance:  Patient tolerated the procedure well with no immediate complications

## 2025-07-17 ENCOUNTER — TELEPHONE (OUTPATIENT)
Dept: PODIATRY | Facility: CLINIC | Age: 40
End: 2025-07-17
Payer: MEDICARE

## 2025-07-17 NOTE — PROGRESS NOTES
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 Sensation: 2 sites tested.  0 sites sensed.   Skin Integrity: Positive for ulcer, skin breakdown, callus and dry skin. Negative for erythema or warmth.   Neurological: He is alert and oriented to person, place, and time. He displays no weakness. A sensory deficit is present. He exhibits normal muscle tone. Gait (surgical heel wedge shoe L) abnormal.   Skin: Lesion noted. No bruising, no ecchymosis, no rash and no abscess (resolved lateral L heel) noted. Papular rash: see pix.. No erythema. There are signs of injury.   L heel ulcer plantar lateral w/ hyperkeratotic tissue rim, measures 2.6 x 2.8 x 0.2 cm.  R midfoot ulcer 2.94x 3.8 x 0.2 cm, also w/ hyperkeratotic rim.  Both ulcers w/ granular base, no undermining nor probe deep.    Dorsal mid 5th toe L healed.   Psychiatric: His behavior is normal. Affect normal. His mood appears not anxious.   Vitals reviewed.    X-Ray Toe 2 or More Views Left  Narrative: EXAMINATION:  XR TOE 2 OR MORE VIEWS LEFT    CLINICAL HISTORY:  post op;    TECHNIQUE:  Three views of the left toes were performed    COMPARISON:  Left foot 10/11/2024    FINDINGS:  Postoperative change status post osteotomy majority of the left 5th proximal and middle phalanges.  There is soft tissue swelling fullness at the site.  Continued advanced degenerative change of the 1st  interphalangeal joint.  Continued hallux valgus deformity with cystic change in the medial aspect of the 1st metatarsal head.  Prominent vascular calcifications again seen.  Clinical correlation and follow-up advised  Impression: Please see above    Electronically signed by: Neel Miranda DO  Date:    10/18/2024  Time:    14:13  I independently reviewed the Xray images. Resected middle phalanx & distal 2/3 proximal phalanx 5th toe as PO.     X-Ray Foot Complete 3 view Right  Narrative: EXAMINATION:  XR FOOT COMPLETE 3 VIEW RIGHT    CLINICAL HISTORY:  . Unspecified open wound, right foot, initial encounter    TECHNIQUE:  AP, lateral, and oblique views of the right foot were performed.    COMPARISON:  None.    FINDINGS:  Transmetatarsal amputation with chronic fusion at the level of the RIGHT 2-3 mid metatarsal level.  Patchy osteopenia. No fracture or dislocation.  Lisfranc articulation is congruent.  Cartilage spaces are maintained.  Diffuse soft tissue swelling.  Osseous calcaneal spur.  Vascular calcifications.  No radiographic evidence for osteomyelitis yet at this is of concern, dedicated MRI could further evaluate.  Findings more consistent with skin and soft tissue infection/inflammation/cellulitis.  Impression: As above.    Electronically signed by: Shyam Reynolds MD  Date:    10/11/2024  Time:    13:41  X-Ray Foot Complete 3 view Left  Narrative: EXAMINATION:  XR FOOT COMPLETE 3 VIEW LEFT    CLINICAL HISTORY:  .  Unspecified open wound, left foot, initial encounter    TECHNIQUE:  AP, lateral and oblique views of the left foot were performed.    COMPARISON:  02/27/2024    FINDINGS:  No fracture or dislocation.  Lisfranc articulation is congruent.  Interval irregularity at the level of the LEFT 5th PIP may be degenerative or post infectious.  Correlation suggested..  Cystic change at the level of the metatarsal head great toe.  Vascular calcifications.  Degenerative changes LEFT 1st DIP.  Flatfoot deformity.   Erosive changes stable at the level of the plantar aspect of the calcaneus.  Prominence T8 of process.  Talar beaking with degenerative change talonavicular joint.  Impression: As above.  Interval irregularity at the level of the LEFT 5th PIP for which correlation advised.  If osteomyelitis is of concern, MRI recommended for further evaluation.    Electronically signed by: Shyam Reynolds MD  Date:    10/11/2024  Time:    13:39  I independently reviewed the x-ray images.  Focus on L foot as ASX R. Bone changes to 5th PIPJ/ middle phalanx consistent w/ area of wound, suspicious for osteomyelitis.  No acute changes in the area of the plantar heel w/ chronic wound.    3/11/24      3/5/24          Problem List Items Addressed This Visit          Orthopedic    History of transmetatarsal amputation of right foot     Other Visit Diagnoses         Visit for wound check    -  Primary      Diabetes mellitus with ESRD (end-stage renal disease)          Diabetic polyneuropathy associated with diabetes mellitus due to underlying condition          Ulcer of left heel, with fat layer exposed        Relevant Orders    Wound Debridement  heel & R midfoot      Chronic ulcer of plantar surface of midfoot, right, with fat layer exposed        Relevant Orders    Wound Debridement  heel & R midfoot        Plan/ Treatment:     - Shoe inspection. Diabetic Foot Education. Patient reminded of the importance of good blood sugar control to help prevent podiatric complications of diabetes. Patient instructed on proper foot hygeine. We discussed wearing proper shoe gear, daily foot inspections, never walking w/out protective shoe gear, annual DM foot exam, sooner prn.       L heel & R plantar midfoot ulcers cleaned w/ Chloraprep & debrided sharply.  Wet-dry Betadine dressing applied. May change dressing q.o.d. w/ Betadine wet-to-dry. To otherwise be kept CDI; no soaking.  Advised on offloading of wounds w/ WB w/ previously dispensed heel wedge  shoe L & PPT cut-out pads to ffload R.    F/u 3 wks.for wound check, sooner prn.        A total of 32 mins.was spent on chart review, patient visit & documentation.

## 2025-07-17 NOTE — TELEPHONE ENCOUNTER
Spoke w/pt and scheduled him on 7/22/25 @1:00pm. Pt verbalized understanding and no further issues discussed.

## 2025-07-22 ENCOUNTER — TELEPHONE (OUTPATIENT)
Dept: PODIATRY | Facility: CLINIC | Age: 40
End: 2025-07-22
Payer: MEDICARE

## 2025-07-22 ENCOUNTER — OFFICE VISIT (OUTPATIENT)
Dept: PODIATRY | Facility: CLINIC | Age: 40
End: 2025-07-22
Payer: MEDICARE

## 2025-07-22 VITALS
BODY MASS INDEX: 33.14 KG/M2 | SYSTOLIC BLOOD PRESSURE: 138 MMHG | DIASTOLIC BLOOD PRESSURE: 78 MMHG | HEART RATE: 95 BPM | WEIGHT: 244.69 LBS | HEIGHT: 72 IN

## 2025-07-22 DIAGNOSIS — E11.22 DIABETES MELLITUS WITH ESRD (END-STAGE RENAL DISEASE): ICD-10-CM

## 2025-07-22 DIAGNOSIS — N18.6 DIABETES MELLITUS WITH ESRD (END-STAGE RENAL DISEASE): ICD-10-CM

## 2025-07-22 DIAGNOSIS — L97.422 ULCER OF LEFT HEEL, WITH FAT LAYER EXPOSED: ICD-10-CM

## 2025-07-22 DIAGNOSIS — L97.412 CHRONIC ULCER OF PLANTAR SURFACE OF MIDFOOT, RIGHT, WITH FAT LAYER EXPOSED: ICD-10-CM

## 2025-07-22 DIAGNOSIS — Z51.89 VISIT FOR WOUND CHECK: Primary | ICD-10-CM

## 2025-07-22 DIAGNOSIS — E08.42 DIABETIC POLYNEUROPATHY ASSOCIATED WITH DIABETES MELLITUS DUE TO UNDERLYING CONDITION: ICD-10-CM

## 2025-07-22 DIAGNOSIS — Z89.431 HISTORY OF TRANSMETATARSAL AMPUTATION OF RIGHT FOOT: ICD-10-CM

## 2025-07-22 PROCEDURE — 99999 PR PBB SHADOW E&M-EST. PATIENT-LVL III: CPT | Mod: PBBFAC,,, | Performed by: PODIATRIST

## 2025-07-22 NOTE — TELEPHONE ENCOUNTER
Spoke w/pt & it is ok for him to be late. Verbalized understanding and no further issues discussed.

## 2025-07-29 NOTE — PROCEDURES
Wound Debridement  heel & R midfoot    Date/Time: 7/22/2025 1:00 PM    Performed by: Colleen Garcia DPM  Authorized by: Colleen Garcia DPM      Wound Details:    Location:  Left foot    Location:  Left Heel    Type of Debridement:  Excisional       Length (cm):  2.6       Width (cm):  2.8       Depth (cm):  0.2       Area (sq cm):  5.72       Percent Debrided (%):  100       Total Area Debrided (sq cm):  5.72    Depth of debridement:  Subcutaneous tissue    Tissue debrided:  Epidermis and Dermis    Devitalized tissue debrided:  Callus and Slough    Instruments:  Blade and Nippers  Bleeding:  Minimal  Hemostasis Achieved: Yes  Method Used:  Pressure and Silver Nitrate    Additional wounds:  1    2nd Wound Details:     Location:  Right foot    Location:  Right Plantar    Location:  Right Plantar    Type of Debridement:  Excisional       Length (cm):  2.4       Area (sq cm):  7.16       Width (cm):  3.8       Percent Debrided (%):  100       Depth (cm):  0.2       Total Area Debrided (sq cm):  7.16    Depth of debridement:  Epidermis/Dermis    Tissue debrided:  Epidermis and Dermis    Devitalized tissue debrided:  Callus and Biofilm    Instruments:  Nippers and Blade  Bleeding:  Minimal  Hemostasis Achieved: Yes    Method Used:  Pressure and Silver Nitrate  Patient tolerance:  Patient tolerated the procedure well with no immediate complications

## 2025-08-20 ENCOUNTER — OFFICE VISIT (OUTPATIENT)
Dept: PODIATRY | Facility: CLINIC | Age: 40
End: 2025-08-20
Payer: MEDICARE

## 2025-08-20 VITALS
DIASTOLIC BLOOD PRESSURE: 94 MMHG | HEART RATE: 94 BPM | SYSTOLIC BLOOD PRESSURE: 150 MMHG | BODY MASS INDEX: 32.53 KG/M2 | HEIGHT: 72 IN | WEIGHT: 240.19 LBS

## 2025-08-20 DIAGNOSIS — N18.6 ESRD ON HEMODIALYSIS: Chronic | ICD-10-CM

## 2025-08-20 DIAGNOSIS — I73.9 PAD (PERIPHERAL ARTERY DISEASE): Chronic | ICD-10-CM

## 2025-08-20 DIAGNOSIS — L97.412 CHRONIC ULCER OF PLANTAR SURFACE OF MIDFOOT, RIGHT, WITH FAT LAYER EXPOSED: ICD-10-CM

## 2025-08-20 DIAGNOSIS — Z51.89 VISIT FOR WOUND CHECK: Primary | ICD-10-CM

## 2025-08-20 DIAGNOSIS — I82.412 ACUTE DEEP VEIN THROMBOSIS (DVT) OF FEMORAL VEIN OF LEFT LOWER EXTREMITY: ICD-10-CM

## 2025-08-20 DIAGNOSIS — L97.422 CHRONIC ULCER OF HEEL, LEFT, WITH FAT LAYER EXPOSED: ICD-10-CM

## 2025-08-20 DIAGNOSIS — Z89.431 HISTORY OF TRANSMETATARSAL AMPUTATION OF RIGHT FOOT: ICD-10-CM

## 2025-08-20 DIAGNOSIS — Z99.2 ESRD ON HEMODIALYSIS: Chronic | ICD-10-CM

## 2025-08-20 PROCEDURE — 11042 DBRDMT SUBQ TIS 1ST 20SQCM/<: CPT | Mod: S$GLB,,, | Performed by: PODIATRIST

## 2025-08-20 PROCEDURE — 99213 OFFICE O/P EST LOW 20 MIN: CPT | Mod: 25,S$GLB,, | Performed by: PODIATRIST

## 2025-08-20 PROCEDURE — 3080F DIAST BP >= 90 MM HG: CPT | Mod: CPTII,S$GLB,, | Performed by: PODIATRIST

## 2025-08-20 PROCEDURE — 3008F BODY MASS INDEX DOCD: CPT | Mod: CPTII,S$GLB,, | Performed by: PODIATRIST

## 2025-08-20 PROCEDURE — 99999 PR PBB SHADOW E&M-EST. PATIENT-LVL III: CPT | Mod: PBBFAC,,, | Performed by: PODIATRIST

## 2025-08-20 PROCEDURE — 1159F MED LIST DOCD IN RCRD: CPT | Mod: CPTII,S$GLB,, | Performed by: PODIATRIST

## 2025-08-20 PROCEDURE — 3044F HG A1C LEVEL LT 7.0%: CPT | Mod: CPTII,S$GLB,, | Performed by: PODIATRIST

## 2025-08-20 PROCEDURE — 4010F ACE/ARB THERAPY RXD/TAKEN: CPT | Mod: CPTII,S$GLB,, | Performed by: PODIATRIST

## 2025-08-20 PROCEDURE — 3077F SYST BP >= 140 MM HG: CPT | Mod: CPTII,S$GLB,, | Performed by: PODIATRIST

## 2025-09-06 PROBLEM — L03.115 CELLULITIS OF RIGHT LOWER EXTREMITY: Status: ACTIVE | Noted: 2025-09-06
